# Patient Record
Sex: MALE | Race: WHITE | Employment: OTHER | URBAN - METROPOLITAN AREA
[De-identification: names, ages, dates, MRNs, and addresses within clinical notes are randomized per-mention and may not be internally consistent; named-entity substitution may affect disease eponyms.]

---

## 2020-03-26 ENCOUNTER — HOSPITAL ENCOUNTER (INPATIENT)
Age: 60
LOS: 2 days | Discharge: HOME | DRG: 432 | End: 2020-03-28
Attending: EMERGENCY MEDICINE | Admitting: INTERNAL MEDICINE

## 2020-03-26 ENCOUNTER — APPOINTMENT (OUTPATIENT)
Dept: ULTRASOUND IMAGING | Age: 60
DRG: 432 | End: 2020-03-26

## 2020-03-26 ENCOUNTER — APPOINTMENT (OUTPATIENT)
Dept: MRI IMAGING | Age: 60
DRG: 432 | End: 2020-03-26

## 2020-03-26 ENCOUNTER — APPOINTMENT (OUTPATIENT)
Dept: GENERAL RADIOLOGY | Age: 60
DRG: 432 | End: 2020-03-26

## 2020-03-26 ENCOUNTER — APPOINTMENT (OUTPATIENT)
Dept: CT IMAGING | Age: 60
DRG: 432 | End: 2020-03-26

## 2020-03-26 PROBLEM — D53.9 MACROCYTIC ANEMIA: Status: ACTIVE | Noted: 2020-03-26

## 2020-03-26 PROBLEM — E87.1 HYPONATREMIA: Status: ACTIVE | Noted: 2020-03-26

## 2020-03-26 PROBLEM — E87.6 HYPOKALEMIA: Status: ACTIVE | Noted: 2020-03-26

## 2020-03-26 PROBLEM — K70.30 ALCOHOLIC CIRRHOSIS OF LIVER WITHOUT ASCITES (HCC): Status: ACTIVE | Noted: 2020-03-26

## 2020-03-26 PROBLEM — E86.1 HYPOTENSION DUE TO HYPOVOLEMIA: Status: ACTIVE | Noted: 2020-03-26

## 2020-03-26 PROBLEM — R63.4 UNINTENTIONAL WEIGHT LOSS: Status: ACTIVE | Noted: 2020-03-26

## 2020-03-26 PROBLEM — I95.89 HYPOTENSION DUE TO HYPOVOLEMIA: Status: ACTIVE | Noted: 2020-03-26

## 2020-03-26 PROBLEM — F10.10 ALCOHOL ABUSE: Status: ACTIVE | Noted: 2020-03-26

## 2020-03-26 PROBLEM — K70.10 ALCOHOLIC HEPATITIS WITHOUT ASCITES: Status: ACTIVE | Noted: 2020-03-26

## 2020-03-26 PROBLEM — E80.6 HYPERBILIRUBINEMIA: Status: ACTIVE | Noted: 2020-03-26

## 2020-03-26 LAB
ABSOLUTE EOS #: 0.05 K/UL (ref 0–0.4)
ABSOLUTE IMMATURE GRANULOCYTE: ABNORMAL K/UL (ref 0–0.3)
ABSOLUTE LYMPH #: 0.92 K/UL (ref 1–4.8)
ABSOLUTE MONO #: 0.49 K/UL (ref 0.1–1.3)
ACETAMINOPHEN LEVEL: <5 UG/ML (ref 10–30)
ALBUMIN SERPL-MCNC: 2.6 G/DL (ref 3.5–5.2)
ALBUMIN/GLOBULIN RATIO: ABNORMAL (ref 1–2.5)
ALP BLD-CCNC: 178 U/L (ref 40–129)
ALT SERPL-CCNC: 125 U/L (ref 5–41)
ANION GAP SERPL CALCULATED.3IONS-SCNC: 19 MMOL/L (ref 9–17)
AST SERPL-CCNC: 383 U/L
BASOPHILS # BLD: 1 % (ref 0–2)
BASOPHILS ABSOLUTE: 0.05 K/UL (ref 0–0.2)
BILIRUB SERPL-MCNC: 26.48 MG/DL (ref 0.3–1.2)
BILIRUBIN DIRECT: 21.95 MG/DL
BUN BLDV-MCNC: 17 MG/DL (ref 6–20)
BUN/CREAT BLD: ABNORMAL (ref 9–20)
C-REACTIVE PROTEIN: 18.9 MG/L (ref 0–5)
CALCIUM SERPL-MCNC: 10.7 MG/DL (ref 8.6–10.4)
CHLORIDE BLD-SCNC: 91 MMOL/L (ref 98–107)
CO2: 23 MMOL/L (ref 20–31)
CREAT SERPL-MCNC: <0.4 MG/DL (ref 0.7–1.2)
DIFFERENTIAL TYPE: ABNORMAL
EOSINOPHILS RELATIVE PERCENT: 1 % (ref 0–4)
ETHANOL PERCENT: <0.01 %
ETHANOL: <10 MG/DL
GFR AFRICAN AMERICAN: ABNORMAL ML/MIN
GFR NON-AFRICAN AMERICAN: ABNORMAL ML/MIN
GFR SERPL CREATININE-BSD FRML MDRD: ABNORMAL ML/MIN/{1.73_M2}
GFR SERPL CREATININE-BSD FRML MDRD: ABNORMAL ML/MIN/{1.73_M2}
GLUCOSE BLD-MCNC: 107 MG/DL (ref 70–99)
HAV IGM SER IA-ACNC: NONREACTIVE
HCT VFR BLD CALC: 34.3 % (ref 41–53)
HEMOGLOBIN: 11.6 G/DL (ref 13.5–17.5)
HEPATITIS B CORE IGM ANTIBODY: NONREACTIVE
HEPATITIS B SURFACE ANTIGEN: NONREACTIVE
HEPATITIS C ANTIBODY: NONREACTIVE
HIV AG/AB: NONREACTIVE
IMMATURE GRANULOCYTES: ABNORMAL %
INR BLD: 1.1
IRON SATURATION: ABNORMAL % (ref 20–55)
IRON: 165 UG/DL (ref 59–158)
LYMPHOCYTES # BLD: 17 % (ref 24–44)
MAGNESIUM: 1.7 MG/DL (ref 1.6–2.6)
MCH RBC QN AUTO: 38.2 PG (ref 26–34)
MCHC RBC AUTO-ENTMCNC: 34 G/DL (ref 31–37)
MCV RBC AUTO: 112.5 FL (ref 80–100)
MONOCYTES # BLD: 9 % (ref 1–7)
MORPHOLOGY: ABNORMAL
NRBC AUTOMATED: ABNORMAL PER 100 WBC
PARTIAL THROMBOPLASTIN TIME: 31.6 SEC (ref 24–36)
PATHOLOGIST REVIEW: NORMAL
PDW BLD-RTO: 21.4 % (ref 11.5–14.9)
PHOSPHORUS: 2.3 MG/DL (ref 2.5–4.5)
PLATELET # BLD: 266 K/UL (ref 150–450)
PLATELET ESTIMATE: ABNORMAL
PMV BLD AUTO: 8.2 FL (ref 6–12)
POTASSIUM SERPL-SCNC: 2.3 MMOL/L (ref 3.7–5.3)
PROTHROMBIN TIME: 13.9 SEC (ref 11.8–14.6)
RBC # BLD: 3.05 M/UL (ref 4.5–5.9)
RBC # BLD: ABNORMAL 10*6/UL
SEDIMENTATION RATE, ERYTHROCYTE: 46 MM (ref 0–15)
SEG NEUTROPHILS: 72 % (ref 36–66)
SEGMENTED NEUTROPHILS ABSOLUTE COUNT: 3.89 K/UL (ref 1.3–9.1)
SODIUM BLD-SCNC: 133 MMOL/L (ref 135–144)
TOTAL IRON BINDING CAPACITY: ABNORMAL UG/DL (ref 250–450)
TOTAL PROTEIN: 5.3 G/DL (ref 6.4–8.3)
TRANSFERRIN: 130 MG/DL (ref 200–360)
TSH SERPL DL<=0.05 MIU/L-ACNC: 0.91 MIU/L (ref 0.3–5)
UNSATURATED IRON BINDING CAPACITY: <17 UG/DL (ref 112–347)
WBC # BLD: 5.4 K/UL (ref 3.5–11)
WBC # BLD: ABNORMAL 10*3/UL

## 2020-03-26 PROCEDURE — 83516 IMMUNOASSAY NONANTIBODY: CPT

## 2020-03-26 PROCEDURE — 36415 COLL VENOUS BLD VENIPUNCTURE: CPT

## 2020-03-26 PROCEDURE — 71046 X-RAY EXAM CHEST 2 VIEWS: CPT

## 2020-03-26 PROCEDURE — 2580000003 HC RX 258: Performed by: INTERNAL MEDICINE

## 2020-03-26 PROCEDURE — 87389 HIV-1 AG W/HIV-1&-2 AB AG IA: CPT

## 2020-03-26 PROCEDURE — 80307 DRUG TEST PRSMV CHEM ANLYZR: CPT

## 2020-03-26 PROCEDURE — 85730 THROMBOPLASTIN TIME PARTIAL: CPT

## 2020-03-26 PROCEDURE — 76377 3D RENDER W/INTRP POSTPROCES: CPT

## 2020-03-26 PROCEDURE — 84100 ASSAY OF PHOSPHORUS: CPT

## 2020-03-26 PROCEDURE — 76705 ECHO EXAM OF ABDOMEN: CPT

## 2020-03-26 PROCEDURE — 6370000000 HC RX 637 (ALT 250 FOR IP): Performed by: STUDENT IN AN ORGANIZED HEALTH CARE EDUCATION/TRAINING PROGRAM

## 2020-03-26 PROCEDURE — 99223 1ST HOSP IP/OBS HIGH 75: CPT | Performed by: INTERNAL MEDICINE

## 2020-03-26 PROCEDURE — 85651 RBC SED RATE NONAUTOMATED: CPT

## 2020-03-26 PROCEDURE — 84466 ASSAY OF TRANSFERRIN: CPT

## 2020-03-26 PROCEDURE — 2580000003 HC RX 258: Performed by: STUDENT IN AN ORGANIZED HEALTH CARE EDUCATION/TRAINING PROGRAM

## 2020-03-26 PROCEDURE — 93005 ELECTROCARDIOGRAM TRACING: CPT | Performed by: EMERGENCY MEDICINE

## 2020-03-26 PROCEDURE — 6360000002 HC RX W HCPCS: Performed by: INTERNAL MEDICINE

## 2020-03-26 PROCEDURE — 6370000000 HC RX 637 (ALT 250 FOR IP): Performed by: EMERGENCY MEDICINE

## 2020-03-26 PROCEDURE — 84443 ASSAY THYROID STIM HORMONE: CPT

## 2020-03-26 PROCEDURE — 80074 ACUTE HEPATITIS PANEL: CPT

## 2020-03-26 PROCEDURE — 83735 ASSAY OF MAGNESIUM: CPT

## 2020-03-26 PROCEDURE — 83550 IRON BINDING TEST: CPT

## 2020-03-26 PROCEDURE — 85025 COMPLETE CBC W/AUTO DIFF WBC: CPT

## 2020-03-26 PROCEDURE — 82248 BILIRUBIN DIRECT: CPT

## 2020-03-26 PROCEDURE — 74177 CT ABD & PELVIS W/CONTRAST: CPT

## 2020-03-26 PROCEDURE — 80053 COMPREHEN METABOLIC PANEL: CPT

## 2020-03-26 PROCEDURE — 85610 PROTHROMBIN TIME: CPT

## 2020-03-26 PROCEDURE — 86140 C-REACTIVE PROTEIN: CPT

## 2020-03-26 PROCEDURE — 6360000004 HC RX CONTRAST MEDICATION: Performed by: EMERGENCY MEDICINE

## 2020-03-26 PROCEDURE — 83540 ASSAY OF IRON: CPT

## 2020-03-26 PROCEDURE — 99285 EMERGENCY DEPT VISIT HI MDM: CPT

## 2020-03-26 PROCEDURE — 2580000003 HC RX 258: Performed by: EMERGENCY MEDICINE

## 2020-03-26 PROCEDURE — 86038 ANTINUCLEAR ANTIBODIES: CPT

## 2020-03-26 PROCEDURE — G0480 DRUG TEST DEF 1-7 CLASSES: HCPCS

## 2020-03-26 PROCEDURE — 2060000000 HC ICU INTERMEDIATE R&B

## 2020-03-26 RX ORDER — SODIUM CHLORIDE 0.9 % (FLUSH) 0.9 %
10 SYRINGE (ML) INJECTION PRN
Status: DISCONTINUED | OUTPATIENT
Start: 2020-03-26 | End: 2020-03-28 | Stop reason: HOSPADM

## 2020-03-26 RX ORDER — DIPHENHYDRAMINE HCL, IBUPROFEN 25; 200 MG/1; MG/1
1 CAPSULE, LIQUID FILLED ORAL NIGHTLY PRN
COMMUNITY
End: 2020-05-12

## 2020-03-26 RX ORDER — ONDANSETRON 4 MG/1
4 TABLET, ORALLY DISINTEGRATING ORAL ONCE
Status: COMPLETED | OUTPATIENT
Start: 2020-03-26 | End: 2020-03-26

## 2020-03-26 RX ORDER — 0.9 % SODIUM CHLORIDE 0.9 %
80 INTRAVENOUS SOLUTION INTRAVENOUS ONCE
Status: COMPLETED | OUTPATIENT
Start: 2020-03-26 | End: 2020-03-26

## 2020-03-26 RX ORDER — FAMOTIDINE 20 MG/1
20 TABLET, FILM COATED ORAL 2 TIMES DAILY
Status: DISCONTINUED | OUTPATIENT
Start: 2020-03-26 | End: 2020-03-28 | Stop reason: HOSPADM

## 2020-03-26 RX ORDER — POLYETHYLENE GLYCOL 3350 17 G/17G
17 POWDER, FOR SOLUTION ORAL DAILY PRN
Status: DISCONTINUED | OUTPATIENT
Start: 2020-03-26 | End: 2020-03-28 | Stop reason: HOSPADM

## 2020-03-26 RX ORDER — PROMETHAZINE HYDROCHLORIDE 25 MG/1
12.5 TABLET ORAL EVERY 6 HOURS PRN
Status: DISCONTINUED | OUTPATIENT
Start: 2020-03-26 | End: 2020-03-28 | Stop reason: HOSPADM

## 2020-03-26 RX ORDER — LORAZEPAM 2 MG/ML
1 INJECTION INTRAMUSCULAR
Status: DISCONTINUED | OUTPATIENT
Start: 2020-03-26 | End: 2020-03-27

## 2020-03-26 RX ORDER — THIAMINE MONONITRATE (VIT B1) 100 MG
100 TABLET ORAL DAILY
Status: DISCONTINUED | OUTPATIENT
Start: 2020-03-26 | End: 2020-03-28 | Stop reason: HOSPADM

## 2020-03-26 RX ORDER — POTASSIUM CHLORIDE 20 MEQ/1
40 TABLET, EXTENDED RELEASE ORAL PRN
Status: DISCONTINUED | OUTPATIENT
Start: 2020-03-26 | End: 2020-03-28 | Stop reason: HOSPADM

## 2020-03-26 RX ORDER — SODIUM CHLORIDE 9 MG/ML
INJECTION, SOLUTION INTRAVENOUS CONTINUOUS
Status: DISCONTINUED | OUTPATIENT
Start: 2020-03-26 | End: 2020-03-27

## 2020-03-26 RX ORDER — POTASSIUM CHLORIDE 40 MEQ/30ML
60 LIQUID ORAL ONCE
Status: DISCONTINUED | OUTPATIENT
Start: 2020-03-26 | End: 2020-03-26

## 2020-03-26 RX ORDER — 0.9 % SODIUM CHLORIDE 0.9 %
1000 INTRAVENOUS SOLUTION INTRAVENOUS ONCE
Status: COMPLETED | OUTPATIENT
Start: 2020-03-26 | End: 2020-03-26

## 2020-03-26 RX ORDER — LORAZEPAM 2 MG/ML
4 INJECTION INTRAMUSCULAR
Status: DISCONTINUED | OUTPATIENT
Start: 2020-03-26 | End: 2020-03-27

## 2020-03-26 RX ORDER — POTASSIUM CHLORIDE 7.45 MG/ML
10 INJECTION INTRAVENOUS PRN
Status: DISCONTINUED | OUTPATIENT
Start: 2020-03-26 | End: 2020-03-28 | Stop reason: HOSPADM

## 2020-03-26 RX ORDER — ONDANSETRON 2 MG/ML
4 INJECTION INTRAMUSCULAR; INTRAVENOUS EVERY 6 HOURS PRN
Status: DISCONTINUED | OUTPATIENT
Start: 2020-03-26 | End: 2020-03-28 | Stop reason: HOSPADM

## 2020-03-26 RX ORDER — LORAZEPAM 1 MG/1
3 TABLET ORAL
Status: DISCONTINUED | OUTPATIENT
Start: 2020-03-26 | End: 2020-03-27

## 2020-03-26 RX ORDER — LORAZEPAM 1 MG/1
1 TABLET ORAL
Status: DISCONTINUED | OUTPATIENT
Start: 2020-03-26 | End: 2020-03-27

## 2020-03-26 RX ORDER — SODIUM CHLORIDE 9 MG/ML
INJECTION, SOLUTION INTRAVENOUS CONTINUOUS
Status: DISCONTINUED | OUTPATIENT
Start: 2020-03-26 | End: 2020-03-27 | Stop reason: SDUPTHER

## 2020-03-26 RX ORDER — SODIUM CHLORIDE 0.9 % (FLUSH) 0.9 %
10 SYRINGE (ML) INJECTION EVERY 12 HOURS SCHEDULED
Status: DISCONTINUED | OUTPATIENT
Start: 2020-03-26 | End: 2020-03-27 | Stop reason: SDUPTHER

## 2020-03-26 RX ORDER — M-VIT,TX,IRON,MINS/CALC/FOLIC 27MG-0.4MG
1 TABLET ORAL DAILY
Status: DISCONTINUED | OUTPATIENT
Start: 2020-03-26 | End: 2020-03-28 | Stop reason: HOSPADM

## 2020-03-26 RX ORDER — SODIUM CHLORIDE 0.9 % (FLUSH) 0.9 %
10 SYRINGE (ML) INJECTION EVERY 12 HOURS SCHEDULED
Status: DISCONTINUED | OUTPATIENT
Start: 2020-03-26 | End: 2020-03-28 | Stop reason: HOSPADM

## 2020-03-26 RX ORDER — FOLIC ACID 1 MG/1
1 TABLET ORAL DAILY
Status: DISCONTINUED | OUTPATIENT
Start: 2020-03-26 | End: 2020-03-28 | Stop reason: HOSPADM

## 2020-03-26 RX ORDER — LORAZEPAM 1 MG/1
4 TABLET ORAL
Status: DISCONTINUED | OUTPATIENT
Start: 2020-03-26 | End: 2020-03-27

## 2020-03-26 RX ORDER — LORAZEPAM 2 MG/ML
2 INJECTION INTRAMUSCULAR
Status: DISCONTINUED | OUTPATIENT
Start: 2020-03-26 | End: 2020-03-27

## 2020-03-26 RX ORDER — SODIUM CHLORIDE 0.9 % (FLUSH) 0.9 %
10 SYRINGE (ML) INJECTION PRN
Status: DISCONTINUED | OUTPATIENT
Start: 2020-03-26 | End: 2020-03-27 | Stop reason: SDUPTHER

## 2020-03-26 RX ORDER — LORAZEPAM 1 MG/1
2 TABLET ORAL
Status: DISCONTINUED | OUTPATIENT
Start: 2020-03-26 | End: 2020-03-27

## 2020-03-26 RX ORDER — LORAZEPAM 2 MG/ML
3 INJECTION INTRAMUSCULAR
Status: DISCONTINUED | OUTPATIENT
Start: 2020-03-26 | End: 2020-03-27

## 2020-03-26 RX ORDER — 0.9 % SODIUM CHLORIDE 0.9 %
1000 INTRAVENOUS SOLUTION INTRAVENOUS ONCE
Status: DISCONTINUED | OUTPATIENT
Start: 2020-03-26 | End: 2020-03-26

## 2020-03-26 RX ADMIN — SODIUM CHLORIDE 1000 ML: 9 INJECTION, SOLUTION INTRAVENOUS at 15:16

## 2020-03-26 RX ADMIN — THIAMINE HCL TAB 100 MG 100 MG: 100 TAB at 23:02

## 2020-03-26 RX ADMIN — MULTIPLE VITAMINS W/ MINERALS TAB 1 TABLET: TAB at 23:02

## 2020-03-26 RX ADMIN — SODIUM CHLORIDE 1000 ML: 9 INJECTION, SOLUTION INTRAVENOUS at 14:35

## 2020-03-26 RX ADMIN — ONDANSETRON 4 MG: 4 TABLET, ORALLY DISINTEGRATING ORAL at 15:35

## 2020-03-26 RX ADMIN — SODIUM CHLORIDE 80 ML: 9 INJECTION, SOLUTION INTRAVENOUS at 15:55

## 2020-03-26 RX ADMIN — POTASSIUM BICARBONATE 60 MEQ: 782 TABLET, EFFERVESCENT ORAL at 15:35

## 2020-03-26 RX ADMIN — SODIUM CHLORIDE: 9 INJECTION, SOLUTION INTRAVENOUS at 20:54

## 2020-03-26 RX ADMIN — FOLIC ACID 1 MG: 1 TABLET ORAL at 23:02

## 2020-03-26 RX ADMIN — IOPAMIDOL 75 ML: 755 INJECTION, SOLUTION INTRAVENOUS at 15:55

## 2020-03-26 RX ADMIN — FAMOTIDINE 20 MG: 20 TABLET ORAL at 23:02

## 2020-03-26 RX ADMIN — SODIUM CHLORIDE: 9 INJECTION, SOLUTION INTRAVENOUS at 17:26

## 2020-03-26 RX ADMIN — Medication 10 ML: at 23:00

## 2020-03-26 RX ADMIN — Medication 10 ML: at 15:55

## 2020-03-26 ASSESSMENT — ENCOUNTER SYMPTOMS
WHEEZING: 0
COLOR CHANGE: 1
SHORTNESS OF BREATH: 0
CONSTIPATION: 0
SORE THROAT: 0
BACK PAIN: 0
ABDOMINAL PAIN: 0
NAUSEA: 0
BLOOD IN STOOL: 0
COUGH: 0
SINUS PRESSURE: 0
VOMITING: 0
DIARRHEA: 0
SINUS PAIN: 0
RHINORRHEA: 0

## 2020-03-26 NOTE — ED NOTES
Bed: 08  Expected date:   Expected time:   Means of arrival:   Comments:  Chela Espinal RN  03/26/20 7770

## 2020-03-26 NOTE — H&P
use.  Drug Use:  reports no history of drug use. Family History:     History reviewed. No pertinent family history. Father: Non Hodkins lymphoma      Review of Systems:     Positive and Negative as described in HPI. Review of Systems   Constitutional: Positive for appetite change, fatigue and unexpected weight change. Negative for chills, diaphoresis and fever. HENT: Negative for congestion, rhinorrhea, sinus pressure, sinus pain and sore throat. Eyes: Negative for visual disturbance. Respiratory: Negative for cough, shortness of breath and wheezing. Cardiovascular: Negative for chest pain, palpitations and leg swelling. Gastrointestinal: Negative for blood in stool, constipation, diarrhea, nausea and vomiting. Genitourinary: Negative for difficulty urinating and dysuria. Musculoskeletal: Negative for arthralgias, back pain, joint swelling and neck stiffness. Neurological: Positive for weakness. Negative for dizziness, light-headedness and headaches. Psychiatric/Behavioral: Negative for dysphoric mood and suicidal ideas. Physical Exam:   BP 99/73   Pulse 80   Temp 98.5 °F (36.9 °C) (Oral)   Resp 17   Wt 185 lb (83.9 kg)   SpO2 98%   Temp (24hrs), Av.5 °F (36.9 °C), Min:98.5 °F (36.9 °C), Max:98.5 °F (36.9 °C)    No results for input(s): POCGLU in the last 72 hours. No intake or output data in the 24 hours ending 20 1711    Physical Exam  Vitals signs and nursing note reviewed. Constitutional:       General: He is not in acute distress. Appearance: He is normal weight. Comments: Patient is very jaundiced   HENT:      Head: Normocephalic and atraumatic. Mouth/Throat:      Mouth: Mucous membranes are dry. Pharynx: No oropharyngeal exudate or posterior oropharyngeal erythema. Eyes:      General: Scleral icterus present. Extraocular Movements: Extraocular movements intact. Pupils: Pupils are equal, round, and reactive to light. sedimentation rate, CRP, anti-smooth muscle, antimitochondrial, TSH, iron, transferrin  - GI is following    *Alcohol abuse  -Patient states he has been sober for 3 weeks  - [facility] protocol  -Chest x-ray     *Malnutrition, poor oral intake  - Dietary consult  - Low threshold for refeeding syndrome  -Monitor phosphorus and magnesium    *Hypokalemia without EKG changes  - EKG shows normal sinus rhythm  - Potassium 2.3  -60 mEq given in the ED  -Potassium sliding scale    *Mild hyponatremia  -Sodium 133  -Normal saline 3 L bolus  -We will recheck    Code: Full  DVT: Lovenox  Diet: General  GI: Pepcid 20 mg twice daily  Disposition: Admit      Consultations:   IP CONSULT TO GI  IP CONSULT TO INTERNAL MEDICINE    Patient is admitted as inpatient status because of co-morbiditieslisted above, severity of signs and symptoms as outlined, requirement for current medical therapies and most importantly because of direct risk to patient if care not provided in a hospital setting. Angie Funk MD  3/26/2020  5:11 PM    Copy sent to Dr. Samuel Kurtz primary care provider on file. Attending Physician Statement  I have discussed the care of Hao Camacho with the resident team. I have examined the patient myself and taken ros and hpi , including pertinent history and exam findings,  with the resident. I have reviewed the key elements of all parts of the encounter with the resident. I agree with the assessment, plan and orders as documented by the resident. 62yr old male admitted with 2 week h/o fatigue, wt loss and progressive jaundice.    Patient Active Problem List   Diagnosis    Hyperbilirubinemia    Alcoholic hepatitis without ascites    Unintentional weight loss    Hypotension due to hypovolemia    Macrocytic anemia    Hyponatremia    Hypokalemia    Alcohol abuse     Liver enzymes elevated, PT normal, Maddrey score 23- no need for steroids  Last drink 3 wk ago- low risk of withdrawal  Jauindice- CT abdo- no

## 2020-03-26 NOTE — ED PROVIDER NOTES
Acetaminophen Level <5 (*)     All other components within normal limits   BILIRUBIN, DIRECT - Abnormal; Notable for the following components:    Bilirubin, Direct 21.95 (*)     All other components within normal limits   APTT   PROTIME-INR   ETHANOL   PERIPHERAL BLOOD SMEAR, PATH REVIEW   HEPATITIS PANEL, ACUTE       EMERGENCY DEPARTMENTCOURSE:         Vitals:    Vitals:    03/26/20 1600 03/26/20 1615 03/26/20 1630 03/26/20 1645   BP: 102/82 99/73 94/69 93/71   Pulse: 75 80 87 84   Resp: 14 17 18 14   Temp:       TempSrc:       SpO2: 97% 98% 94% 97%   Weight:           The patient was given the following medications while in the emergency department:  Orders Placed This Encounter   Medications    0.9 % sodium chloride bolus    DISCONTD: 0.9 % sodium chloride bolus    0.9 % sodium chloride bolus    DISCONTD: potassium chloride 20 MEQ/15ML (10%) (PED-ISAAC) solution 60 mEq    potassium bicarb-citric acid (EFFER-K) effervescent tablet 60 mEq    ondansetron (ZOFRAN-ODT) disintegrating tablet 4 mg    iopamidol (ISOVUE-370) 76 % injection 75 mL    0.9 % sodium chloride bolus    sodium chloride flush 0.9 % injection 10 mL    0.9 % sodium chloride infusion     CONSULTS:  IP CONSULT TO GI  IP CONSULT TO INTERNAL MEDICINE    FINAL IMPRESSION      1. Hyperbilirubinemia    2. Alcoholic hepatitis without ascites    3. Hypokalemia    4. Dehydration          DISPOSITION/PLAN   DISPOSITION Admitted 03/26/2020 05:08:50 PM      PATIENT REFERRED TO:  No follow-up provider specified.   DISCHARGE MEDICATIONS:  New Prescriptions    No medications on file     Jatin Feng MD  Attending Emergency Physician                    Jhonny Dexter MD  03/26/20 0260

## 2020-03-27 PROBLEM — E43 SEVERE MALNUTRITION (HCC): Status: ACTIVE | Noted: 2020-03-27

## 2020-03-27 PROBLEM — D64.9 ANEMIA: Status: ACTIVE | Noted: 2020-03-26

## 2020-03-27 LAB
ABSOLUTE EOS #: 0.1 K/UL (ref 0–0.4)
ABSOLUTE IMMATURE GRANULOCYTE: ABNORMAL K/UL (ref 0–0.3)
ABSOLUTE LYMPH #: 1.63 K/UL (ref 1–4.8)
ABSOLUTE MONO #: 0.38 K/UL (ref 0.1–1.3)
AFP: 1.6 UG/L
ALBUMIN SERPL-MCNC: 2.3 G/DL (ref 3.5–5.2)
ALBUMIN/GLOBULIN RATIO: ABNORMAL (ref 1–2.5)
ALP BLD-CCNC: 154 U/L (ref 40–129)
ALPHA-1 ANTITRYPSIN: 146 MG/DL (ref 90–200)
ALT SERPL-CCNC: 103 U/L (ref 5–41)
AMMONIA: 68 UMOL/L (ref 16–60)
ANION GAP SERPL CALCULATED.3IONS-SCNC: 10 MMOL/L (ref 9–17)
ANION GAP SERPL CALCULATED.3IONS-SCNC: 10 MMOL/L (ref 9–17)
ANTI-NUCLEAR ANTIBODY (ANA): NEGATIVE
AST SERPL-CCNC: 301 U/L
BASOPHILS # BLD: 2 % (ref 0–2)
BASOPHILS ABSOLUTE: 0.1 K/UL (ref 0–0.2)
BILIRUB SERPL-MCNC: 22.28 MG/DL (ref 0.3–1.2)
BUN BLDV-MCNC: 14 MG/DL (ref 6–20)
BUN BLDV-MCNC: 16 MG/DL (ref 6–20)
BUN/CREAT BLD: ABNORMAL (ref 9–20)
BUN/CREAT BLD: ABNORMAL (ref 9–20)
CALCIUM SERPL-MCNC: 9.2 MG/DL (ref 8.6–10.4)
CALCIUM SERPL-MCNC: 9.4 MG/DL (ref 8.6–10.4)
CERULOPLASMIN: 25 MG/DL (ref 15–30)
CHLORIDE BLD-SCNC: 100 MMOL/L (ref 98–107)
CHLORIDE BLD-SCNC: 97 MMOL/L (ref 98–107)
CO2: 24 MMOL/L (ref 20–31)
CO2: 25 MMOL/L (ref 20–31)
CREAT SERPL-MCNC: <0.4 MG/DL (ref 0.7–1.2)
CREAT SERPL-MCNC: <0.4 MG/DL (ref 0.7–1.2)
DIFFERENTIAL TYPE: ABNORMAL
EKG ATRIAL RATE: 75 BPM
EKG P AXIS: 27 DEGREES
EKG P-R INTERVAL: 172 MS
EKG Q-T INTERVAL: 426 MS
EKG QRS DURATION: 106 MS
EKG QTC CALCULATION (BAZETT): 475 MS
EKG R AXIS: -42 DEGREES
EKG T AXIS: 0 DEGREES
EKG VENTRICULAR RATE: 75 BPM
EOSINOPHILS RELATIVE PERCENT: 2 % (ref 0–4)
GFR AFRICAN AMERICAN: ABNORMAL ML/MIN
GFR AFRICAN AMERICAN: ABNORMAL ML/MIN
GFR NON-AFRICAN AMERICAN: ABNORMAL ML/MIN
GFR NON-AFRICAN AMERICAN: ABNORMAL ML/MIN
GFR SERPL CREATININE-BSD FRML MDRD: ABNORMAL ML/MIN/{1.73_M2}
GGT: 203 U/L (ref 8–61)
GLUCOSE BLD-MCNC: 102 MG/DL (ref 70–99)
GLUCOSE BLD-MCNC: 98 MG/DL (ref 70–99)
HCT VFR BLD CALC: 27.1 % (ref 41–53)
HEMOGLOBIN: 9.5 G/DL (ref 13.5–17.5)
IGA: 603 MG/DL (ref 70–400)
IMMATURE GRANULOCYTES: ABNORMAL %
LYMPHOCYTES # BLD: 34 % (ref 24–44)
MAGNESIUM: 1.7 MG/DL (ref 1.6–2.6)
MAGNESIUM: 1.7 MG/DL (ref 1.6–2.6)
MCH RBC QN AUTO: 39.7 PG (ref 26–34)
MCHC RBC AUTO-ENTMCNC: 34.9 G/DL (ref 31–37)
MCV RBC AUTO: 113.6 FL (ref 80–100)
MONOCYTES # BLD: 8 % (ref 1–7)
MORPHOLOGY: ABNORMAL
NRBC AUTOMATED: ABNORMAL PER 100 WBC
PATHOLOGIST REVIEW: NORMAL
PDW BLD-RTO: 20.8 % (ref 11.5–14.9)
PLATELET # BLD: 190 K/UL (ref 150–450)
PLATELET ESTIMATE: ABNORMAL
PMV BLD AUTO: 7.8 FL (ref 6–12)
POTASSIUM SERPL-SCNC: 2.4 MMOL/L (ref 3.7–5.3)
POTASSIUM SERPL-SCNC: 2.7 MMOL/L (ref 3.7–5.3)
POTASSIUM SERPL-SCNC: 3.1 MMOL/L (ref 3.7–5.3)
POTASSIUM SERPL-SCNC: 3.1 MMOL/L (ref 3.7–5.3)
POTASSIUM SERPL-SCNC: 3.6 MMOL/L (ref 3.7–5.3)
RBC # BLD: 2.38 M/UL (ref 4.5–5.9)
RBC # BLD: ABNORMAL 10*6/UL
SALICYLATE LEVEL: <1 MG/DL (ref 3–10)
SEG NEUTROPHILS: 54 % (ref 36–66)
SEGMENTED NEUTROPHILS ABSOLUTE COUNT: 2.59 K/UL (ref 1.3–9.1)
SODIUM BLD-SCNC: 132 MMOL/L (ref 135–144)
SODIUM BLD-SCNC: 134 MMOL/L (ref 135–144)
TOTAL PROTEIN: 4.5 G/DL (ref 6.4–8.3)
WBC # BLD: 4.8 K/UL (ref 3.5–11)
WBC # BLD: ABNORMAL 10*3/UL

## 2020-03-27 PROCEDURE — 86663 EPSTEIN-BARR ANTIBODY: CPT

## 2020-03-27 PROCEDURE — 86645 CMV ANTIBODY IGM: CPT

## 2020-03-27 PROCEDURE — 97165 OT EVAL LOW COMPLEX 30 MIN: CPT

## 2020-03-27 PROCEDURE — 6370000000 HC RX 637 (ALT 250 FOR IP): Performed by: STUDENT IN AN ORGANIZED HEALTH CARE EDUCATION/TRAINING PROGRAM

## 2020-03-27 PROCEDURE — 6360000002 HC RX W HCPCS: Performed by: INTERNAL MEDICINE

## 2020-03-27 PROCEDURE — 86644 CMV ANTIBODY: CPT

## 2020-03-27 PROCEDURE — 2580000003 HC RX 258: Performed by: INTERNAL MEDICINE

## 2020-03-27 PROCEDURE — 82140 ASSAY OF AMMONIA: CPT

## 2020-03-27 PROCEDURE — 83735 ASSAY OF MAGNESIUM: CPT

## 2020-03-27 PROCEDURE — 99254 IP/OBS CNSLTJ NEW/EST MOD 60: CPT | Performed by: INTERNAL MEDICINE

## 2020-03-27 PROCEDURE — 82103 ALPHA-1-ANTITRYPSIN TOTAL: CPT

## 2020-03-27 PROCEDURE — 84132 ASSAY OF SERUM POTASSIUM: CPT

## 2020-03-27 PROCEDURE — 82390 ASSAY OF CERULOPLASMIN: CPT

## 2020-03-27 PROCEDURE — 82784 ASSAY IGA/IGD/IGG/IGM EACH: CPT

## 2020-03-27 PROCEDURE — 80053 COMPREHEN METABOLIC PANEL: CPT

## 2020-03-27 PROCEDURE — 85025 COMPLETE CBC W/AUTO DIFF WBC: CPT

## 2020-03-27 PROCEDURE — 99239 HOSP IP/OBS DSCHRG MGMT >30: CPT | Performed by: INTERNAL MEDICINE

## 2020-03-27 PROCEDURE — 82105 ALPHA-FETOPROTEIN SERUM: CPT

## 2020-03-27 PROCEDURE — 2580000003 HC RX 258: Performed by: STUDENT IN AN ORGANIZED HEALTH CARE EDUCATION/TRAINING PROGRAM

## 2020-03-27 PROCEDURE — 86665 EPSTEIN-BARR CAPSID VCA: CPT

## 2020-03-27 PROCEDURE — 80048 BASIC METABOLIC PNL TOTAL CA: CPT

## 2020-03-27 PROCEDURE — 86664 EPSTEIN-BARR NUCLEAR ANTIGEN: CPT

## 2020-03-27 PROCEDURE — APPNB30 APP NON BILLABLE TIME 0-30 MINS: Performed by: NURSE PRACTITIONER

## 2020-03-27 PROCEDURE — 2060000000 HC ICU INTERMEDIATE R&B

## 2020-03-27 PROCEDURE — 86376 MICROSOMAL ANTIBODY EACH: CPT

## 2020-03-27 PROCEDURE — 36415 COLL VENOUS BLD VENIPUNCTURE: CPT

## 2020-03-27 PROCEDURE — 93010 ELECTROCARDIOGRAM REPORT: CPT | Performed by: INTERNAL MEDICINE

## 2020-03-27 PROCEDURE — 82977 ASSAY OF GGT: CPT

## 2020-03-27 PROCEDURE — 80307 DRUG TEST PRSMV CHEM ANLYZR: CPT

## 2020-03-27 RX ORDER — MIDODRINE HYDROCHLORIDE 10 MG/1
10 TABLET ORAL 3 TIMES DAILY
Status: DISCONTINUED | OUTPATIENT
Start: 2020-03-27 | End: 2020-03-28 | Stop reason: HOSPADM

## 2020-03-27 RX ORDER — MIDODRINE HYDROCHLORIDE 5 MG/1
5 TABLET ORAL ONCE
Status: COMPLETED | OUTPATIENT
Start: 2020-03-27 | End: 2020-03-27

## 2020-03-27 RX ORDER — MIDODRINE HYDROCHLORIDE 5 MG/1
5 TABLET ORAL
Status: DISCONTINUED | OUTPATIENT
Start: 2020-03-27 | End: 2020-03-27

## 2020-03-27 RX ORDER — MIDODRINE HYDROCHLORIDE 10 MG/1
10 TABLET ORAL 3 TIMES DAILY
Status: DISCONTINUED | OUTPATIENT
Start: 2020-03-27 | End: 2020-03-27

## 2020-03-27 RX ADMIN — MIDODRINE HYDROCHLORIDE 10 MG: 10 TABLET ORAL at 19:11

## 2020-03-27 RX ADMIN — FAMOTIDINE 20 MG: 20 TABLET ORAL at 23:33

## 2020-03-27 RX ADMIN — POTASSIUM CHLORIDE: 2 INJECTION, SOLUTION, CONCENTRATE INTRAVENOUS at 02:55

## 2020-03-27 RX ADMIN — POTASSIUM BICARBONATE 40 MEQ: 782 TABLET, EFFERVESCENT ORAL at 12:02

## 2020-03-27 RX ADMIN — MIDODRINE HYDROCHLORIDE 5 MG: 5 TABLET ORAL at 14:34

## 2020-03-27 RX ADMIN — FAMOTIDINE 20 MG: 20 TABLET ORAL at 09:50

## 2020-03-27 RX ADMIN — THIAMINE HCL TAB 100 MG 100 MG: 100 TAB at 09:50

## 2020-03-27 RX ADMIN — ENOXAPARIN SODIUM 40 MG: 40 INJECTION SUBCUTANEOUS at 09:49

## 2020-03-27 RX ADMIN — MIDODRINE HYDROCHLORIDE 5 MG: 5 TABLET ORAL at 16:49

## 2020-03-27 RX ADMIN — FOLIC ACID 1 MG: 1 TABLET ORAL at 09:50

## 2020-03-27 RX ADMIN — POTASSIUM BICARBONATE 40 MEQ: 782 TABLET, EFFERVESCENT ORAL at 16:49

## 2020-03-27 RX ADMIN — MULTIPLE VITAMINS W/ MINERALS TAB 1 TABLET: TAB at 09:50

## 2020-03-27 RX ADMIN — SODIUM CHLORIDE: 9 INJECTION, SOLUTION INTRAVENOUS at 02:55

## 2020-03-27 ASSESSMENT — PAIN SCALES - GENERAL
PAINLEVEL_OUTOF10: 0

## 2020-03-27 NOTE — PROGRESS NOTES
assistance X2 - so he came to the hospital           Goals  Patient Goals   Patient goals : Regain strength to return home   Short term goals  Time Frame for Short term goals: 1-3 days  Short term goal 1: Tolerate 10-25 minutes of general activity / exercise to support ADL needs   Short term goal 2: D/V understanding of fall prevention and home safety strategies with application to care needs   Short term goal 3: Participate in care using safe techniques for mobility and self care tasks     Plan  Safety Devices  Safety Devices in place: Yes  Type of devices: Call light within reach, Left in bed     Plan  Times per week: 1-3 sessions   Times per day: Daily  Current Treatment Recommendations: Strengthening, Endurance Training, Patient/Caregiver Education & Training, Safety Education & Training, Functional Mobility Training, Self-Care / ADL  OT Education  OT Education: OT Role, ADL Adaptive Strategies, Plan of Care, Transfer Training, Energy Conservation, Precautions       OT Individual Minutes  Time In: 2904  Time Out: 4676  Minutes: 25    Electronically signed by Krishna Marie OT on 3/27/20 at 2:45 PM EDT

## 2020-03-27 NOTE — PROGRESS NOTES
Admitted to room 2087 from ED per Dr. Paul Ramey. Oriented to room and call light. Vitals and assessment completed. No distress noted. [facility]  DVT Prophylaxis and Vaccine Status  Work List  Mandatory for all patients      Patient must be on both Chemical prophylaxis and Mechanical prophylaxis.  If chemical/mechanical prophylaxis is not ordered, the physician must document a reason for not using prophylaxis     Chemical Prophylaxis  Is patient on chemical prophylaxis: Yes  If no chemical prophylaxis Is a order in for No Chemical VTE prophylaxisNo  If no was the physician notified not applicable      Mechanical Prophylaxis  Is patient on mechanical prophylaxis, intermittent pneumatic compression device: Yes  If no was the physician notified not applicable        Pneumonia Vaccine  Vaccine indicated:  Not indicated  If indicated was the vaccine given: not applicable    Influenza Vaccine (applicable from October through March):  Vaccine indicated: Vaccination indicated  If indicated was the vaccine given: Patient refused    Patient Education  Education completed on DVT prophylaxis: yes

## 2020-03-27 NOTE — PROGRESS NOTES
Report given to Cecilia Johnson RN about low bp and monitoring for potassium recheck. Midodrine 10 mg administered by Ector Vigil and awaiting recheck of bp as well as result of potassium level that was obtained at 1900.

## 2020-03-27 NOTE — PROGRESS NOTES
Talked with Dr. Carina Simons about low bp. Give midodrine 10 mg po again now. Recheck bp in one hour after administration. If still low, contact CNP overnight for additional orders.

## 2020-03-27 NOTE — PLAN OF CARE
Problem: Infection:  Goal: Will remain free from infection  Description: Will remain free from infection  Outcome: Met This Shift   No active infections    Problem: Safety:  Goal: Free from accidental physical injury  Description: Free from accidental physical injury  Outcome: Met This Shift   No injuries this shift. Patient's safety maintained.     Problem: Daily Care:  Goal: Daily care needs are met  Description: Daily care needs are met  Outcome: Ongoing     Problem: Discharge Planning:  Goal: Patients continuum of care needs are met  Description: Patients continuum of care needs are met  Outcome: Ongoing
thyromegaly or thyroid nodules, no cervical lymphadenopathy   Pulmonary/Chest: clear to auscultation bilaterally- no wheezes, rales or rhonchi, normal air movement, no respiratory distress  Cardiovascular: normal rate, regular rhythm, normal S1 and S2, no murmurs, rubs, clicks or gallops, distal pulses intact, no carotid bruits  Abdomen: soft, obese non tender, non-distended, normal bowel sounds, no masses or organomegaly no ascites  Extremities: no cyanosis, clubbing or edema  Musculoskeletal: normal range of motion, no joint swelling, deformity or tenderness  Neurologic: no cranial nerve deficit and muscle strength normal    Assessment  Elevated lft's with painless jaundice likely etoh hepatitis, hepatic steatosis with  possible cirrhosis  Anemia  Alcohol abuse  Unintentional weight loss    Plan  Will discuss imaging with md  2 gm low salt diet  Trend lft and inr  Salicylate and autoimmune w/o ordered  Will discuss steroids with md since DF is 31  Avoid any sedatives and narcotics  CIWA protocol  Needs etoh rehab      DF score is 31    Formal gi consult to follow  . Denice Márquez, GRACE - CNP

## 2020-03-27 NOTE — DISCHARGE INSTR - COC
Continuity of Care Form    Patient Name: John Paul Oconnor   :  1960  MRN:  [id number]    Admit date:  3/26/2020  Discharge date:  ***    Code Status Order: Full Code   Advance Directives:   Advance Care Flowsheet Documentation     Date/Time Healthcare Directive Type of Healthcare Directive Copy in [address] Agent's Name Healthcare Agent's Phone Number    20  No, patient does not have an advance directive for healthcare treatment  --  --  --  --  --          Admitting Physician:  Hermelindo Youngblood MD  PCP: No primary care provider on file. Discharging Nurse: [facility] Unit/Room#: 2087/2087-01  Discharging Unit Phone Number: ***    Emergency Contact:   Extended Emergency Contact Information  Primary Emergency Contact: Vernell Villatoro  Home Phone: [phone]  Relation: Parent  Secondary Emergency Contact: Ari Mathew  Home Phone: [phone]  Relation: Other    Past Surgical History:  Past Surgical History:   Procedure Laterality Date    TONSILLECTOMY         Immunization History: There is no immunization history on file for this patient.     Active Problems:  Patient Active Problem List   Diagnosis Code    Hyperbilirubinemia O95.2    Alcoholic hepatitis without ascites K70.10    Unintentional weight loss R63.4    Hypotension due to hypovolemia I95.89, E86.1    Anemia D64.9    Hyponatremia E87.1    Hypokalemia E87.6    Alcohol abuse F10.10       Isolation/Infection:   Isolation          No Isolation        Patient Infection Status     None to display          Nurse Assessment:  Last Vital Signs: /79   Pulse 73   Temp 97.6 °F (36.4 °C) (Oral)   Resp 16   Ht 6' 1\" (1.854 m)   Wt 184 lb 1.4 oz (83.5 kg)   SpO2 93%   BMI 24.29 kg/m²     Last documented pain score (0-10 scale): Pain Level: 0  Last Weight:   Wt Readings from Last 1 Encounters:   20 184 lb 1.4 oz (83.5 kg)     Mental Status:  {IP PT MENTAL STATUS:55135}    IV Access:  [address] [employer] IV ACCESS:810140158}    Nursing Mobility/ADLs:  Walking   {CHP DME COGN:108106028}  Transfer  {CHP DME TLHR:340487881}  Bathing  {CHP DME ZOOJ:438473523}  Dressing  {CHP DME MKFT:078272326}  Toileting  {CHP DME TWUL:814724531}  Feeding  {CHP DME MKPQ:682381046}  Med Admin  {CHP DME ERXA:107352076}  Med Delivery   { TASHA MED Delivery:558752960}    Wound Care Documentation and Therapy:        Elimination:  Continence:   · Bowel: {YES / BJ:59523}  · Bladder: {YES / TC:60290}  Urinary Catheter: {Urinary Catheter:211067237}   Colostomy/Ileostomy/Ileal Conduit: {YES / MS:09432}       Date of Last BM: ***    Intake/Output Summary (Last 24 hours) at 3/27/2020 1204  Last data filed at 3/27/2020 1151  Gross per 24 hour   Intake 3553.75 ml   Output 1050 ml   Net 2503.75 ml     I/O last 3 completed shifts:   In: 1435 [I.V.:1435]  Out: 550 [Urine:550]    Safety Concerns:     [employer] Safety Concerns:200678764}    Impairments/Disabilities:      [address] [employer] Impairments/Disabilities:871703970}    Nutrition Therapy:  Current Nutrition Therapy:   [employer] Diet List:599520980}    Routes of Feeding: {[facility] DME Other Feedings:965997342}  Liquids: {Slp liquid thickness:68153}  Daily Fluid Restriction: {CHP DME Yes amt example:134963343}  Last Modified Barium Swallow with Video (Video Swallowing Test): {Done Not Done JDSF:677855768}    Treatments at the Time of Hospital Discharge:   Respiratory Treatments: ***  Oxygen Therapy:  {Therapy; copd oxygen:83025}  Ventilator:    { KIRSTIN Vent MKIO:250035252}    Rehab Therapies: {THERAPEUTIC INTERVENTION:9653224754}  Weight Bearing Status/Restrictions: [address] Maicol  Weight Bearin}  Other Medical Equipment (for information only, NOT a DME order):  {EQUIPMENT:632066515}  Other Treatments: ***    Patient's personal belongings (please select all that are sent with patient):  {LYNDSEY DME Belongings:358485637}    RN SIGNATURE:  {Esignature:537562691}    CASE MANAGEMENT/SOCIAL WORK

## 2020-03-27 NOTE — CONSULTS
Intimate partner violence     Fear of current or ex partner: Not on file     Emotionally abused: Not on file     Physically abused: Not on file     Forced sexual activity: Not on file   Other Topics Concern    Not on file   Social History Narrative    Not on file         REVIEW OF SYSTEMS: A 14-point review of systems was obtained and pertinent positives andnegatives were enumerated above in the history of present illness. All other reviewed systems / symptoms were negative. Review of Systems      PHYSICAL EXAMINATION: Vital signs reviewed per the nursing documentation. BP 92/75   Pulse 81   Temp 99.5 °F (37.5 °C) (Axillary)   Resp 16   Ht 6' 1\" (1.854 m)   Wt 184 lb 1.4 oz (83.5 kg)   SpO2 90%   BMI 24.29 kg/m²    [unfilled]   Body mass index is 24.29 kg/m². General:  A O x 3 in NAD icteric   Psych: . Normal affect. Mentation normal   HEENT: PERRLA. Clear conjunctivae and sclerae. Moist oral mucosae, no lesions orulcers. The neck is supple, without lymphadenopathy or jugular venous distension. No masses. Normal thyroid. Cardiovascular: S1 S2 RRR no rubs or murmurs. Pulmonary: clear BL. No accessory muscle usage. Exam: Soft, NT ND, no hepato or spleno megaly, +BS, no ascites. No groin masses or lymphadenopathy. Extremities: No edema. Skin: Warm skin. No skin rash. No spider nevi palmar erythema naildystrophy. Joint: No joint swelling or deformity. Neurological: intact sensory. DTR+.  No asterixis     LABORATORY DATA: Reviewed   Lab Results   Component Value Date    WBC 4.8 03/27/2020    HGB 9.5 (L) 03/27/2020    HCT 27.1 (L) 03/27/2020    .6 (H) 03/27/2020     03/27/2020     (L) 03/27/2020    K 3.1 (L) 03/27/2020     03/27/2020    CO2 24 03/27/2020    BUN 14 03/27/2020    CREATININE <0.40 (L) 03/27/2020    LABALBU 2.3 (L) 03/27/2020    BILITOT 22.28 (HH) 03/27/2020    ALKPHOS 154 (H) 03/27/2020     (H) 03/27/2020     (H) 03/27/2020 INR 1.1 03/26/2020      Lab Results   Component Value Date    RBC 2.38 (L) 03/27/2020    HGB 9.5 (L) 03/27/2020    .6 (H) 03/27/2020    MCH 39.7 (H) 03/27/2020    MCHC 34.9 03/27/2020    RDW 20.8 (H) 03/27/2020    MPV 7.8 03/27/2020    BASOPCT 2 03/27/2020    LYMPHSABS 1.63 03/27/2020    MONOSABS 0.38 03/27/2020    NEUTROABS 2.59 03/27/2020    EOSABS 0.10 03/27/2020    BASOSABS 0.10 03/27/2020          DIAGNOSTIC TESTING:   Xr Chest Standard (2 Vw)    Result Date: 3/26/2020  EXAMINATION: TWO XRAY VIEWS OF THE CHEST 3/26/2020 8:35 pm COMPARISON: None. HISTORY: ORDERING SYSTEM PROVIDED HISTORY: aspiration risk TECHNOLOGIST PROVIDED HISTORY: aspiration risk Reason for Exam: PT CO cough with SOB, fatigue, weakness X several days. Acuity: Acute Type of Exam: Initial FINDINGS: The cardiomediastinal silhouette is unremarkable. Linear focus of scarring or atelectasis in the right middle lobe. The lungs otherwise are clear. No infiltrate, pleural fluid or evidence of overt failure. No acute osseous findings. No acute cardiopulmonary disease. Linear scarring or atelectasis in the right middle lobe. Ct Abdomen Pelvis W Iv Contrast Additional Contrast? None    Result Date: 3/26/2020  EXAMINATION: CT OF THE ABDOMEN AND PELVIS WITH CONTRAST 3/26/2020 3:25 pm TECHNIQUE: CT of the abdomen and pelvis was performed with the administration of intravenous contrast. Multiplanar reformatted images are provided for review. Dose modulation, iterative reconstruction, and/or weight based adjustment of the mA/kV was utilized to reduce the radiation dose to as low as reasonably achievable. COMPARISON: Right upper quadrant ultrasound today. HISTORY: ORDERING SYSTEM PROVIDED HISTORY: painless jaundice TECHNOLOGIST PROVIDED HISTORY: painless jaundice Reason for Exam: painless jaundice Acuity: Acute Type of Exam: Initial FINDINGS: Lower Chest: Subsegmental atelectasis in the lung bases.  Organs: Marked hepatic steatosis and

## 2020-03-27 NOTE — PROGRESS NOTES
Nutrition Assessment    Type and Reason for Visit: Consult(Poor intake/ appetite)    Nutrition Recommendations: Continue Low Sodium, Low-Fiber, Low-Fat diet. Nutrition Assessment: Patient appears to be severely malnourished as evidence by weight loss and poor appetite for about a month prior to admission. Patient is admitted to [facility] and has a medical history for alcohol abuse. Patient states that he is feeling much better and ate well at for dinner last night (3/26) and breakfast this morning. Continue current diet and monitor p.o intakes and labs. Malnutrition Assessment:  · Malnutrition Status: Meets the criteria for severe malnutrition  · Context: Acute illness or injury  · Findings of the 6 clinical characteristics of malnutrition (Minimum of 2 out of 6 clinical characteristics is required to make the diagnosis of moderate or severe Protein Calorie Malnutrition based on AND/ASPEN Guidelines):  1. Energy Intake-Less than or equal to 50% of estimated energy requirement, Greater than or equal to 1 month    2. Weight Loss-5% loss or greater, in 1 month  3. Fat Loss-Unable to assess,    4. Muscle Loss-Unable to assess,    5. Fluid Accumulation-No significant fluid accumulation, Extremities  6.  Strength-Not measured    Nutrition Risk Level: High    Nutrient Needs:  · Estimated Daily Total Kcal: 4547-7035 kcal based on 25-28 kcal/kg of admission weight   · Estimated Daily Protein (g): 100-117 gm of protein based on 1.2-1.4 gm/kg of admission weight     Nutrition Diagnosis:   · Problem: Altered nutrition-related lab values  · Etiology: related to Alteration in GI function     Signs and symptoms:  as evidenced by Lab values(jaundice, elevated GI profile labs)    Objective Information:  · Nutrition-Focused Physical Findings: No edema.  Jaundice   · Current Nutrition Therapies:  · Oral Diet Orders: 2gm Sodium, Low Fat, Low Fiber   · Oral Diet intake: %  · Oral Nutrition Supplement (ONS)

## 2020-03-27 NOTE — PROGRESS NOTES
Exam: Initial FINDINGS: The cardiomediastinal silhouette is unremarkable. Linear focus of scarring or atelectasis in the right middle lobe. The lungs otherwise are clear. No infiltrate, pleural fluid or evidence of overt failure. No acute osseous findings. No acute cardiopulmonary disease. Linear scarring or atelectasis in the right middle lobe. Ct Abdomen Pelvis W Iv Contrast Additional Contrast? None    Result Date: 3/26/2020  EXAMINATION: CT OF THE ABDOMEN AND PELVIS WITH CONTRAST 3/26/2020 3:25 pm TECHNIQUE: CT of the abdomen and pelvis was performed with the administration of intravenous contrast. Multiplanar reformatted images are provided for review. Dose modulation, iterative reconstruction, and/or weight based adjustment of the mA/kV was utilized to reduce the radiation dose to as low as reasonably achievable. COMPARISON: Right upper quadrant ultrasound today. HISTORY: ORDERING SYSTEM PROVIDED HISTORY: painless jaundice TECHNOLOGIST PROVIDED HISTORY: painless jaundice Reason for Exam: painless jaundice Acuity: Acute Type of Exam: Initial FINDINGS: Lower Chest: Subsegmental atelectasis in the lung bases. Organs: Marked hepatic steatosis and hepatomegaly measuring 23 cm. No morphologic evidence for cirrhosis or focal liver lesion. The gallbladder is without acute abnormality. Calcification is noted near the cystic duct, unclear whether this is in the cystic duct or an adjacent calcified lymph node. No evidence for biliary dilatation. The pancreas, spleen, adrenals and kidneys reveal no acute findings. GI/Bowel: There is no bowel dilatation or wall thickening identified. Diverticulosis. Pelvis: No acute findings. The bladder is well distended. Peritoneum/Retroperitoneum: Few mildly prominent periportal lymph nodes are noted. No free air. No ascites. The aorta is normal in caliber. Bones/Soft Tissues: Moderately severe disc disease and lower lumbar facet arthropathy.   No acute abnormality

## 2020-03-28 VITALS
HEART RATE: 84 BPM | BODY MASS INDEX: 24.4 KG/M2 | SYSTOLIC BLOOD PRESSURE: 90 MMHG | RESPIRATION RATE: 16 BRPM | OXYGEN SATURATION: 95 % | DIASTOLIC BLOOD PRESSURE: 59 MMHG | WEIGHT: 184.08 LBS | TEMPERATURE: 98.5 F | HEIGHT: 73 IN

## 2020-03-28 LAB
ABSOLUTE EOS #: 0.07 K/UL (ref 0–0.4)
ABSOLUTE IMMATURE GRANULOCYTE: ABNORMAL K/UL (ref 0–0.3)
ABSOLUTE LYMPH #: 2.09 K/UL (ref 1–4.8)
ABSOLUTE MONO #: 0.5 K/UL (ref 0.1–1.3)
ALBUMIN SERPL-MCNC: 2.3 G/DL (ref 3.5–5.2)
ALBUMIN/GLOBULIN RATIO: ABNORMAL (ref 1–2.5)
ALP BLD-CCNC: 160 U/L (ref 40–129)
ALT SERPL-CCNC: 113 U/L (ref 5–41)
ANION GAP SERPL CALCULATED.3IONS-SCNC: 12 MMOL/L (ref 9–17)
AST SERPL-CCNC: 325 U/L
BASOPHILS # BLD: 1 % (ref 0–2)
BASOPHILS ABSOLUTE: 0.07 K/UL (ref 0–0.2)
BILIRUB SERPL-MCNC: 21.12 MG/DL (ref 0.3–1.2)
BUN BLDV-MCNC: 12 MG/DL (ref 6–20)
BUN/CREAT BLD: ABNORMAL (ref 9–20)
CALCIUM SERPL-MCNC: 9 MG/DL (ref 8.6–10.4)
CHLORIDE BLD-SCNC: 98 MMOL/L (ref 98–107)
CO2: 24 MMOL/L (ref 20–31)
CREAT SERPL-MCNC: <0.4 MG/DL (ref 0.7–1.2)
DIFFERENTIAL TYPE: ABNORMAL
EOSINOPHILS RELATIVE PERCENT: 1 % (ref 0–4)
GFR AFRICAN AMERICAN: ABNORMAL ML/MIN
GFR NON-AFRICAN AMERICAN: ABNORMAL ML/MIN
GFR SERPL CREATININE-BSD FRML MDRD: ABNORMAL ML/MIN/{1.73_M2}
GFR SERPL CREATININE-BSD FRML MDRD: ABNORMAL ML/MIN/{1.73_M2}
GLUCOSE BLD-MCNC: 105 MG/DL (ref 70–99)
HCT VFR BLD CALC: 26 % (ref 41–53)
HEMOGLOBIN: 9.3 G/DL (ref 13.5–17.5)
IMMATURE GRANULOCYTES: ABNORMAL %
LIVER-KIDNEY MICROSOMAL AB: NORMAL
LYMPHOCYTES # BLD: 29 % (ref 24–44)
MAGNESIUM: 1.5 MG/DL (ref 1.6–2.6)
MCH RBC QN AUTO: 40 PG (ref 26–34)
MCHC RBC AUTO-ENTMCNC: 35.7 G/DL (ref 31–37)
MCV RBC AUTO: 112.3 FL (ref 80–100)
MITOCHONDRIAL ANTIBODY: 3.6 UNITS (ref 0–20)
MONOCYTES # BLD: 7 % (ref 1–7)
MORPHOLOGY: ABNORMAL
NRBC AUTOMATED: ABNORMAL PER 100 WBC
PATHOLOGIST REVIEW: NORMAL
PDW BLD-RTO: 20.9 % (ref 11.5–14.9)
PLATELET # BLD: 185 K/UL (ref 150–450)
PLATELET ESTIMATE: ABNORMAL
PMV BLD AUTO: 8.4 FL (ref 6–12)
POTASSIUM SERPL-SCNC: 3.4 MMOL/L (ref 3.7–5.3)
RBC # BLD: 2.31 M/UL (ref 4.5–5.9)
RBC # BLD: ABNORMAL 10*6/UL
SEG NEUTROPHILS: 62 % (ref 36–66)
SEGMENTED NEUTROPHILS ABSOLUTE COUNT: 4.47 K/UL (ref 1.3–9.1)
SMOOTH MUSCLE ANTIBODY: 7 UNITS (ref 0–19)
SODIUM BLD-SCNC: 134 MMOL/L (ref 135–144)
TOTAL PROTEIN: 4.5 G/DL (ref 6.4–8.3)
WBC # BLD: 7.2 K/UL (ref 3.5–11)
WBC # BLD: ABNORMAL 10*3/UL

## 2020-03-28 PROCEDURE — 6370000000 HC RX 637 (ALT 250 FOR IP): Performed by: STUDENT IN AN ORGANIZED HEALTH CARE EDUCATION/TRAINING PROGRAM

## 2020-03-28 PROCEDURE — 97162 PT EVAL MOD COMPLEX 30 MIN: CPT

## 2020-03-28 PROCEDURE — 80053 COMPREHEN METABOLIC PANEL: CPT

## 2020-03-28 PROCEDURE — 83735 ASSAY OF MAGNESIUM: CPT

## 2020-03-28 PROCEDURE — 6360000002 HC RX W HCPCS: Performed by: STUDENT IN AN ORGANIZED HEALTH CARE EDUCATION/TRAINING PROGRAM

## 2020-03-28 PROCEDURE — 85025 COMPLETE CBC W/AUTO DIFF WBC: CPT

## 2020-03-28 PROCEDURE — 97530 THERAPEUTIC ACTIVITIES: CPT

## 2020-03-28 PROCEDURE — 6360000002 HC RX W HCPCS: Performed by: INTERNAL MEDICINE

## 2020-03-28 PROCEDURE — 36415 COLL VENOUS BLD VENIPUNCTURE: CPT

## 2020-03-28 RX ORDER — MAGNESIUM SULFATE 1 G/100ML
1 INJECTION INTRAVENOUS
Status: DISPENSED | OUTPATIENT
Start: 2020-03-28 | End: 2020-03-28

## 2020-03-28 RX ORDER — MIDODRINE HYDROCHLORIDE 10 MG/1
10 TABLET ORAL 3 TIMES DAILY
Qty: 30 TABLET | Refills: 0 | Status: SHIPPED | OUTPATIENT
Start: 2020-03-28 | End: 2020-05-12

## 2020-03-28 RX ADMIN — MAGNESIUM SULFATE HEPTAHYDRATE 1 G: 1 INJECTION, SOLUTION INTRAVENOUS at 08:32

## 2020-03-28 RX ADMIN — MIDODRINE HYDROCHLORIDE 10 MG: 10 TABLET ORAL at 08:32

## 2020-03-28 RX ADMIN — FAMOTIDINE 20 MG: 20 TABLET ORAL at 08:33

## 2020-03-28 RX ADMIN — ENOXAPARIN SODIUM 40 MG: 40 INJECTION SUBCUTANEOUS at 08:32

## 2020-03-28 RX ADMIN — FOLIC ACID 1 MG: 1 TABLET ORAL at 08:32

## 2020-03-28 RX ADMIN — POTASSIUM CHLORIDE 40 MEQ: 1500 TABLET, EXTENDED RELEASE ORAL at 09:42

## 2020-03-28 RX ADMIN — MIDODRINE HYDROCHLORIDE 10 MG: 10 TABLET ORAL at 14:49

## 2020-03-28 RX ADMIN — MULTIPLE VITAMINS W/ MINERALS TAB 1 TABLET: TAB at 08:33

## 2020-03-28 RX ADMIN — THIAMINE HCL TAB 100 MG 100 MG: 100 TAB at 08:33

## 2020-03-28 ASSESSMENT — PAIN SCALES - GENERAL
PAINLEVEL_OUTOF10: 0
PAINLEVEL_OUTOF10: 0

## 2020-03-28 NOTE — DISCHARGE SUMMARY
resolved,  Blood pressure around 90s over 50s, patient has been given Midodrin multiple doses in order to increase his BP, patient discharged on midodrine 10 mg 3 times a day for blood pressure    GI consulted: Patient should follow-up after 1 week with GI, CMP and INR in 1 week, follow-up with     Significant therapeutic interventions: Lab tests, GI consulted    Significant Diagnostic Studies:   Labs / Micro:  CBC:   Lab Results   Component Value Date    WBC 7.2 03/28/2020    RBC 2.31 03/28/2020    HGB 9.3 03/28/2020    HCT 26.0 03/28/2020    .3 03/28/2020    MCH 40.0 03/28/2020    MCHC 35.7 03/28/2020    RDW 20.9 03/28/2020     03/28/2020     BMP:    Lab Results   Component Value Date    GLUCOSE 105 03/28/2020     03/28/2020    K 3.4 03/28/2020    CL 98 03/28/2020    CO2 24 03/28/2020    ANIONGAP 12 03/28/2020    BUN 12 03/28/2020    CREATININE <0.40 03/28/2020    BUNCRER NOT REPORTED 03/28/2020    CALCIUM 9.0 03/28/2020    LABGLOM CANNOT BE CALCULATED 03/28/2020    GFRAA CANNOT BE CALCULATED 03/28/2020    GFR      03/28/2020    GFR NOT REPORTED 03/28/2020     HFP:    Lab Results   Component Value Date    PROT 4.5 03/28/2020     CMP:    Lab Results   Component Value Date    GLUCOSE 105 03/28/2020     03/28/2020    K 3.4 03/28/2020    CL 98 03/28/2020    CO2 24 03/28/2020    BUN 12 03/28/2020    CREATININE <0.40 03/28/2020    ANIONGAP 12 03/28/2020    ALKPHOS 160 03/28/2020     03/28/2020     03/28/2020    BILITOT 21.12 03/28/2020    LABALBU 2.3 03/28/2020    ALBUMIN NOT REPORTED 03/28/2020    LABGLOM CANNOT BE CALCULATED 03/28/2020    GFRAA CANNOT BE CALCULATED 03/28/2020    GFR      03/28/2020    GFR NOT REPORTED 03/28/2020    PROT 4.5 03/28/2020    CALCIUM 9.0 03/28/2020     PT/INR:    Lab Results   Component Value Date    PROTIME 13.9 03/26/2020    INR 1.1 03/26/2020     PTT:   Lab Results   Component Value Date    APTT 31.6 03/26/2020     FLP:  No results found unclear whether this is in the cystic duct or an adjacent calcified lymph node. No evidence for biliary dilatation. The pancreas, spleen, adrenals and kidneys reveal no acute findings. GI/Bowel: There is no bowel dilatation or wall thickening identified. Diverticulosis. Pelvis: No acute findings. The bladder is well distended. Peritoneum/Retroperitoneum: Few mildly prominent periportal lymph nodes are noted. No free air. No ascites. The aorta is normal in caliber. Bones/Soft Tissues: Moderately severe disc disease and lower lumbar facet arthropathy. No acute abnormality identified. 1.  No acute inflammatory process identified. 2.  Hepatomegaly with marked steatosis. 3.  Punctate calcification near the region of the cystic duct. No secondary signs of acute cholecystitis or biliary dilatation. In the setting of painless jaundice, this is unlikely to represent a cystic duct stone. 4.  Diverticulosis. Us Gallbladder Ruq    Result Date: 3/26/2020  EXAMINATION: RIGHT UPPER QUADRANT ULTRASOUND 3/26/2020 2:53 pm COMPARISON: None. HISTORY: ORDERING SYSTEM PROVIDED HISTORY: new jaundice TECHNOLOGIST PROVIDED HISTORY: new jaundice FINDINGS: LIVER:  The liver is enlarged and demonstrates diffuse increased heterogeneous cord parenchymal echogenicity. There is subtle surface contour nodularity of the liver. The right hepatic length is 22 cm. No intrahepatic biliary ductal dilatation. BILIARY SYSTEM:  Layering material in the gallbladder consistent with sludge. Otherwise, no gallstones, pericholecystic fluid, wall thickening or stones. Negative sonographic Luque's sign. Common bile duct is within normal limits measuring 4 mm. RIGHT KIDNEY: The right kidney is grossly unremarkable without evidence of hydronephrosis. PANCREAS:  Visualized portions of the pancreas are unremarkable. OTHER: No evidence of right upper quadrant ascites. Overall findings suggestive of underlying cirrhosis. Gallbladder sludge.   No other findings to suggest acute cholecystitis. Mri Abdomen Wo Contrast Mrcp    Result Date: 3/27/2020  EXAMINATION: MRI OF THE ABDOMEN WITHOUT CONTRAST AND MRCP 3/26/2020 7:08 pm TECHNIQUE: Multiplanar multisequence MRI of the abdomen was performed without the administration of intravenous contrast.  After initial T2 axial and coronal images, thick slab, thin slab and 3D coronal MRCP sequences were obtained without the administration of intravenous contrast.  MIP images are provided for review. COMPARISON: CT abdomen, pelvis and gallbladder ultrasound 03/26/2020 HISTORY: ORDERING SYSTEM PROVIDED HISTORY: new jaundice, hyperbilirubinemia TECHNOLOGIST PROVIDED HISTORY: new jaundice, hyperbilirubinemia Reason for Exam: New jaundice, hyperbilirubinemia Acuity: Acute Type of Exam: Initial Additional signs and symptoms: Patient arrives to  ED via EMS on 03/26/2020 with Fatigue, Jaundice and Hypotension. Relevant Medical/Surgical History: No relevant surgical hx to area of interest. FINDINGS: Liver: The liver is enlarged with the right hepatic lobe measuring 22.5 cm craniocaudal.  Parenchymal signal is compatible with steatosis. No focal lesion. Gallbladder: Normal gallbladder without evidence of cholelithiasis, wall thickening or pericholecystic fluid. Bile Ducts: No bile duct dilatation. No evidence of choledocholithiasis. Pancreatic Duct: Nondilated. Other:  Remaining solid organs, including the kidneys, spleen and pancreas are unremarkable. No bowel obstruction. No abdominal ascites. Included portions of the urinary bladder are unremarkable. Normal bone marrow signal. No signal abnormality within the lungs. Normal heart size appreciated. No evidence of cholelithiasis, choledocholithiasis, cholecystitis or bile duct dilatation. The previously seen calcification in the region of the cystic duct is not discretely identified on this exam.  Some sequences are limited by motion artifact.  Hepatomegaly with steatosis. Consultations:    Consults:     Final Specialist Recommendations/Findings:   IP CONSULT TO GI  IP CONSULT TO INTERNAL MEDICINE  IP CONSULT TO SOCIAL WORK  IP CONSULT TO DIETITIAN  IP CONSULT TO Jose      The patient was seen and examined on day of discharge and this discharge summary is in conjunction with any daily progress note from day of discharge. Discharge plan:       Disposition: Home    Physician Follow Up:     [facility]  [address]  [address] [place] [id number] [phone]    Call this office to establish a primary care doctor. Abbie Marques MD  [address] [facility] [address] [facility]  [phone]    In 1 week      Herberth Castro, [address] [facility] [address] [facility] [address] [facility]  [phone]    In 2 weeks         Requiring Further Evaluation/Follow Up POST HOSPITALIZATION/Incidental Findings: n/a    Diet: Low-salt diet    Activity: As tolerated    Instructions to Patient: - Take all medications as prescribed  - Follow up with PCP   - In case of any worsening condition please visit Emergency Room. Discharge Medications:      Medication List      START taking these medications    midodrine 10 MG tablet  Commonly known as:  PROAMATINE  Take 1 tablet by mouth 3 times daily for 10 days        CONTINUE taking these medications    Advil -25 MG Caps  Generic drug:  Ibuprofen-diphenhydrAMINE HCl           Where to Get Your Medications      These medications were sent to [address] [place] [id number]    Phone:  [phone]   · midodrine 10 MG tablet         Time Spent on discharge is  31 mins in patient examination, evaluation, counseling as well as medication reconciliation, prescriptions for required medications, discharge plan and follow up.     Electronically signed by   Mickie Grover MD  3/28/2020  1:47 PM      Thank you Dr. Jennifer Lou primary care provider on

## 2020-03-28 NOTE — PROGRESS NOTES
Physical Therapy    Facility/Department: [facility] PROGRESSIVE CARE  Initial Assessment    NAME: Cirilo Meyers  : 1960  MRN: [id number]    Date of Service: 3/28/2020    Discharge Recommendations:  Patient would benefit from continued therapy after discharge        Assessment   Neurological  Neurological (WDL): Within Defined Limits  Body structures, Functions, Activity limitations: Decreased strength;Decreased balance;Decreased functional mobility   Assessment: decline in functional status due to dizziness and weakness fro medical condition  Treatment Diagnosis: difficulty walking  Specific instructions for Next Treatment: ther exs and ther activities as tolerated  Prognosis: Fair  Decision Making: Medium Complexity  Exam: MMT, ROM and functional mobility testing  Clinical Presentation: admitted with fatigue, jaundice and hypotension  REQUIRES PT FOLLOW UP: Yes  Activity Tolerance  Activity Tolerance: Patient limited by fatigue(and dizziness)       Patient Diagnosis(es): The primary encounter diagnosis was Hyperbilirubinemia. Diagnoses of Alcoholic hepatitis without ascites, Hypokalemia, and Dehydration were also pertinent to this visit. has no past medical history on file. has a past surgical history that includes Tonsillectomy. Restrictions  Restrictions/Precautions  Restrictions/Precautions: Fall Risk(feels dizzy and weak with position changes)  Vision/Hearing  Vision: Within Functional Limits  Hearing: Within functional limits     Subjective  General  Chart Reviewed: Yes  Patient assessed for rehabilitation services?: Yes  Family / Caregiver Present: No  Referring Practitioner: Dr Jeanmarie Cervantes  Referral Date : 20  Diagnosis: Alcoholic Hepatitis  Follows Commands: Within Functional Limits  General Comment  Comments: supine in bed  Subjective  Subjective: Pt staed, \" I cannot really do much. \"  Pain Screening  Patient Currently in Pain: Denies  Vital Signs  Patient Currently in Pain: Denies  Pre Treatment Pain Screening  Pain at present: 0  Scale Used: Numeric Score    Orientation  Orientation  Overall Orientation Status: Within Functional Limits  Social/Functional History  Social/Functional History  Lives With: Alone  Type of Home: House  Home Layout: Two level, Performs ADL's on one level, Able to Live on Main level with bedroom/bathroom  Home Access: Stairs to enter without rails  Entrance Stairs - Number of Steps: 3 steps at entry   Bathroom Shower/Tub: Walk-in shower  Bathroom Toilet: Standard  Bathroom Accessibility: Accessible  Home Equipment: (No MR-DME )  Receives Help From: Friend(s)  ADL Assistance: Independent  Homemaking Assistance: Independent  Homemaking Responsibilities: Yes  Ambulation Assistance: Independent  Transfer Assistance: Independent  Active : Yes  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  Leisure & Hobbies: 2 pet dogs ( [employer])   Cognition   Cognition  Overall Cognitive Status: WFL    Objective          AROM RLE (degrees)  RLE AROM: WFL  AROM LLE (degrees)  LLE AROM : WFL  AROM RUE (degrees)  RUE AROM : WFL  AROM LUE (degrees)  LUE AROM : WFL  Strength RLE  Strength RLE: WFL  Comment: grossly 4/5  Strength LLE  Strength LLE: WFL  Comment: grossly 4/5  Strength RUE  Strength RUE: WFL  Comment: grossly 4/5  Strength LUE  Strength LUE: WFL  Comment: grossly 4/5     Sensation  Overall Sensation Status: WFL  Bed mobility  Scooting: Independent  Transfers  Sit to Stand: Stand by assistance  Stand to sit: Stand by assistance  Ambulation  Ambulation?: Yes  Ambulation 1  Surface: level tile  Device: No Device  Assistance: Stand by assistance  Gait Deviations: Slow [place];Decreased step length(reports feeling dizzy when walking)  Distance: 20 feet ( to bathroom and back)  Stairs/Curb  Stairs?: No     Balance  Posture: Fair  Sitting - Static: Fair  Sitting - Dynamic: Fair  Standing - Static: Fair  Standing - Dynamic: Fair;-        Plan Plan  Times per week: 5-6  Times per day: Daily  Plan weeks: 2  Specific instructions for Next Treatment: ther exs and ther activities as tolerated  Current Treatment Recommendations: Strengthening, Balance Training, Functional Mobility Training, Transfer Training, Gait Training  Safety Devices  Type of devices: Call light within reach, Nurse notified, Left in bed  Restraints  Initially in place: No                                   AM-PAC Score     AM-PAC Inpatient Mobility without Stair Climbing Raw Score : 17 (03/28/20 1237)  AM-PAC Inpatient without Stair Climbing T-Scale Score : 48.47 (03/28/20 1237)  Mobility Inpatient CMS 0-100% Score: 32.72 (03/28/20 1237)  Mobility Inpatient without Stair CMS G-Code Modifier : Finn Wilde (03/28/20 1237)       Goals  Short term goals  Time Frame for Short term goals: 5 sessions  Short term goal 1: Improve strength to 5/5 b LE  Short term goal 2:  Independent in sit to stand  Short term goal 3: Improve sitting and standing balance to good  Long term goals  Long term goal 1: Independent ambulation 120 ft without any assistive device  Patient Goals   Patient goals : return home       Therapy Time   Individual Concurrent Group Co-treatment   Time In 1110         Time Out Sreekanth PT

## 2020-03-28 NOTE — PROGRESS NOTES
[address] [facility] [place]    PROGRESS NOTE             3/28/2020    7:46 AM    Name:   Crista Milian  MRN:     [id number]     Acct:      [de-identified]   Room:   2087/2087-01   Day:  2  Admit Date:  3/26/2020  2:20 PM    PCP:  No primary care provider on file. Code Status:  Full Code    Subjective:     C/C:   Chief Complaint   Patient presents with    Fatigue    Jaundice    Hypotension     Interval History Status: not changed. Patient has been seen and examined at the bedside, no major event happened over the night, patient was cleared yesterday from GI status post to be discharged however his blood pressure trended down around 90s over 50s, patient has been given multiple doses of Midodrin, 5 mg twice and 1 dose of 10 mg, his potassium also was low, he is already on potassium sliding scale    Brief History:     See H&P    Review of Systems:     Review of Systems  Constitutional: Positive for appetite change, fatigue and unexpected weight change. Negative for chills, diaphoresis and fever. HENT: Negative for congestion, rhinorrhea, sinus pressure, sinus pain and sore throat.    Eyes: Negative for visual disturbance. Respiratory: Negative for cough, shortness of breath and wheezing.    Cardiovascular: Negative for chest pain, palpitations and leg swelling. Gastrointestinal: Negative for blood in stool, constipation, diarrhea, nausea and vomiting. Genitourinary: Negative for difficulty urinating and dysuria. Musculoskeletal: Negative for arthralgias, back pain, joint swelling and neck stiffness. Neurological: Positive for weakness. Negative for dizziness, light-headedness and headaches. Psychiatric/Behavioral: Negative for dysphoric mood and suicidal ideas    Medications: Allergies:     Allergies   Allergen Reactions    Pcn [Penicillins]      Unknown reaction       Current Meds:   Scheduled Meds:    midodrine  10 mg Oral TID    sodium chloride flush  10 mL Intravenous 2 times per day    enoxaparin  40 mg Subcutaneous Daily    sodium chloride flush  10 mL Intravenous 2 times per day    famotidine  20 mg Oral BID    therapeutic multivitamin-minerals  1 tablet Oral Daily    folic acid  1 mg Oral Daily    thiamine  100 mg Oral Daily     Continuous Infusions:   PRN Meds: sodium chloride flush, sodium chloride flush, sodium chloride flush, polyethylene glycol, promethazine **OR** ondansetron, potassium chloride **OR** potassium alternative oral replacement **OR** potassium chloride    Data:     Past Medical History:   has no past medical history on file. Social History:   reports that he has never smoked. He does not have any smokeless tobacco history on file. He reports previous alcohol use. He reports that he does not use drugs. Family History: History reviewed. No pertinent family history. Vitals:  BP (!) 90/59   Pulse 79   Temp 98.3 °F (36.8 °C) (Oral)   Resp 16   Ht 6' 1\" (1.854 m)   Wt 184 lb 1.4 oz (83.5 kg)   SpO2 100%   BMI 24.29 kg/m²   Temp (24hrs), Av.8 °F (37.1 °C), Min:98.3 °F (36.8 °C), Max:99.5 °F (37.5 °C)    No results for input(s): POCGLU in the last 72 hours. I/O(24Hr): Intake/Output Summary (Last 24 hours) at 3/28/2020 0746  Last data filed at 3/27/2020 1829  Gross per 24 hour   Intake 2668.75 ml   Output 500 ml   Net 2168.75 ml       Labs:  [unfilled]    No results found for: SPECIAL  No results found for: CULTURE    [unfilled]    Radiology:    Xr Chest Standard (2 Vw)    Result Date: 3/26/2020  EXAMINATION: TWO XRAY VIEWS OF THE CHEST 3/26/2020 8:35 pm COMPARISON: None. HISTORY: ORDERING SYSTEM PROVIDED HISTORY: aspiration risk TECHNOLOGIST PROVIDED HISTORY: aspiration risk Reason for Exam: PT CO cough with SOB, fatigue, weakness X several days. Acuity: Acute Type of Exam: Initial FINDINGS: The cardiomediastinal silhouette is unremarkable.   Linear focus of scarring or atelectasis in the right middle lobe. The lungs otherwise are clear. No infiltrate, pleural fluid or evidence of overt failure. No acute osseous findings. No acute cardiopulmonary disease. Linear scarring or atelectasis in the right middle lobe. Ct Abdomen Pelvis W Iv Contrast Additional Contrast? None    Result Date: 3/26/2020  EXAMINATION: CT OF THE ABDOMEN AND PELVIS WITH CONTRAST 3/26/2020 3:25 pm TECHNIQUE: CT of the abdomen and pelvis was performed with the administration of intravenous contrast. Multiplanar reformatted images are provided for review. Dose modulation, iterative reconstruction, and/or weight based adjustment of the mA/kV was utilized to reduce the radiation dose to as low as reasonably achievable. COMPARISON: Right upper quadrant ultrasound today. HISTORY: ORDERING SYSTEM PROVIDED HISTORY: painless jaundice TECHNOLOGIST PROVIDED HISTORY: painless jaundice Reason for Exam: painless jaundice Acuity: Acute Type of Exam: Initial FINDINGS: Lower Chest: Subsegmental atelectasis in the lung bases. Organs: Marked hepatic steatosis and hepatomegaly measuring 23 cm. No morphologic evidence for cirrhosis or focal liver lesion. The gallbladder is without acute abnormality. Calcification is noted near the cystic duct, unclear whether this is in the cystic duct or an adjacent calcified lymph node. No evidence for biliary dilatation. The pancreas, spleen, adrenals and kidneys reveal no acute findings. GI/Bowel: There is no bowel dilatation or wall thickening identified. Diverticulosis. Pelvis: No acute findings. The bladder is well distended. Peritoneum/Retroperitoneum: Few mildly prominent periportal lymph nodes are noted. No free air. No ascites. The aorta is normal in caliber. Bones/Soft Tissues: Moderately severe disc disease and lower lumbar facet arthropathy. No acute abnormality identified. 1.  No acute inflammatory process identified. 2.  Hepatomegaly with marked steatosis.  3.  Punctate hyperbilirubinemia Reason for Exam: New jaundice, hyperbilirubinemia Acuity: Acute Type of Exam: Initial Additional signs and symptoms: Patient arrives to  ED via EMS on 03/26/2020 with Fatigue, Jaundice and Hypotension. Relevant Medical/Surgical History: No relevant surgical hx to area of interest. FINDINGS: Liver: The liver is enlarged with the right hepatic lobe measuring 22.5 cm craniocaudal.  Parenchymal signal is compatible with steatosis. No focal lesion. Gallbladder: Normal gallbladder without evidence of cholelithiasis, wall thickening or pericholecystic fluid. Bile Ducts: No bile duct dilatation. No evidence of choledocholithiasis. Pancreatic Duct: Nondilated. Other:  Remaining solid organs, including the kidneys, spleen and pancreas are unremarkable. No bowel obstruction. No abdominal ascites. Included portions of the urinary bladder are unremarkable. Normal bone marrow signal. No signal abnormality within the lungs. Normal heart size appreciated. No evidence of cholelithiasis, choledocholithiasis, cholecystitis or bile duct dilatation. The previously seen calcification in the region of the cystic duct is not discretely identified on this exam.  Some sequences are limited by motion artifact. Hepatomegaly with steatosis. Physical Examination:        Physical Exam  Vitals signs and nursing note reviewed. Constitutional:       General: He is not in acute distress.     Appearance: He is normal weight.      Comments: Patient is very jaundiced   HENT:      Head: Normocephalic and atraumatic.      Mouth/Throat:      Mouth: Mucous membranes are dry.      Pharynx: No oropharyngeal exudate or posterior oropharyngeal erythema. Eyes:      General: Scleral icterus present.      Extraocular Movements: Extraocular movements intact.      Pupils: Pupils are equal, round, and reactive to light. Cardiovascular:      Rate and Rhythm: Normal rate and regular rhythm.      Pulses: Normal pulses. with market steatosis, calcifications in the cystic duct  -MRI abdomen without contrast: No evidence of cholelithiasis, choledocholithiasis, cholecystitis or parietal duct dilation, hepatomegaly with steatosis  - Status post 3 L normal saline bolus in the ED  - Normal saline 125 cc an hour  - GI is following  - MRCP     #Acute hepatitis without cirrhosis secondary to alcohol versus autoimmune  - -->301--->325,. .... -->103-->113,. .... alk phos 178 -->154-->160  - Hepatitis panel: Nonreactive, HIV: Nonreactive, PRESTON:negative, sedimentation rate:46, CRP:18.9^, anti-smooth muscle:7nl, antimitochondrial, TSH: 0.9nl  -Serial plasmin: 25nl  - IgA: 603 nl  -Alpha-fetoprotein:1.6 nl  -Alpha-1 antitrypsin:146 nl  -GGT: 203 ^  - GI is following: He is clear from their standpoint to be discharged, he will follow-up outpatient  -PT: 13.9 NL, INR: 1.1 and L, PTT: 31.6     *Alcohol abuse  -Patient states he has been sober for 3 weeks  - CIWA protocol is discontinued  -Chest x-ray      *Malnutrition, poor oral intake  - Dietary consult: Low-sodium diet  - Low threshold for refeeding syndrome  - Monitor phosphorus and magnesium  -Transferrin: 130, iron: 165,     *Hypokalemia without EKG changes--- resolved  - EKG shows normal sinus rhythm  - Potassium 2.7--->3.4  -60 mEq given in the ED  -Potassium sliding scale     *Mild hyponatremia--- resolved  -Sodium 132-->134  -Normal saline 3 L bolus  -We will recheck     Code: Full  DVT: Lovenox  Diet: Low-sodium diet  GI: Pepcid 20 mg twice daily  Disposition: Edilia Barbosa MD  3/28/2020  7:46 AM

## 2020-03-30 LAB
CMV IGM: 0.4
CYTOMEGALOVIRUS IGG ANTIBODY: 0.1

## 2020-04-01 LAB
EBV EARLY ANTIGEN IGG: 57 U/ML
EBV INTERPRETATION: ABNORMAL
EBV NUCLEAR AG AB: 511 U/ML
EPSTEIN-BARR VCA IGG: 1434 U/ML
EPSTEIN-BARR VCA IGM: 109 U/ML

## 2020-04-08 ENCOUNTER — TELEPHONE (OUTPATIENT)
Dept: GASTROENTEROLOGY | Age: 60
End: 2020-04-08

## 2020-04-08 NOTE — TELEPHONE ENCOUNTER
Patient called and asked about a follow up appointment with Dr Sharon Maldonado. He states he was in the hospital with Jaundice and it started going away but today started coming back. Appointment made for Video on Monday   Writer reached out to Dr Sharon Maldonado and he stated to get some labs and he will be fine without Alcohol.

## 2020-04-13 ENCOUNTER — TELEPHONE (OUTPATIENT)
Dept: GASTROENTEROLOGY | Age: 60
End: 2020-04-13

## 2020-04-13 ENCOUNTER — VIRTUAL VISIT (OUTPATIENT)
Dept: GASTROENTEROLOGY | Age: 60
End: 2020-04-13

## 2020-04-13 PROCEDURE — 99441 PR PHYS/QHP TELEPHONE EVALUATION 5-10 MIN: CPT | Performed by: INTERNAL MEDICINE

## 2020-04-21 ENCOUNTER — HOSPITAL ENCOUNTER (OUTPATIENT)
Age: 60
Discharge: HOME | End: 2020-04-21

## 2020-04-21 LAB
ABSOLUTE EOS #: 0.24 K/UL (ref 0–0.4)
ABSOLUTE IMMATURE GRANULOCYTE: ABNORMAL K/UL (ref 0–0.3)
ABSOLUTE LYMPH #: 1.22 K/UL (ref 1–4.8)
ABSOLUTE MONO #: 0.65 K/UL (ref 0.1–1.3)
ALBUMIN SERPL-MCNC: 3.3 G/DL (ref 3.5–5.2)
ALBUMIN/GLOBULIN RATIO: ABNORMAL (ref 1–2.5)
ALP BLD-CCNC: 169 U/L (ref 40–129)
ALT SERPL-CCNC: 113 U/L (ref 5–41)
ANION GAP SERPL CALCULATED.3IONS-SCNC: 14 MMOL/L (ref 9–17)
AST SERPL-CCNC: 176 U/L
BASOPHILS # BLD: 2 % (ref 0–2)
BASOPHILS ABSOLUTE: 0.16 K/UL (ref 0–0.2)
BILIRUB SERPL-MCNC: 5.16 MG/DL (ref 0.3–1.2)
BILIRUBIN DIRECT: 2.89 MG/DL
BILIRUBIN, INDIRECT: 2.27 MG/DL (ref 0–1)
BUN BLDV-MCNC: 14 MG/DL (ref 6–20)
CALCIUM SERPL-MCNC: 9.1 MG/DL (ref 8.6–10.4)
CHLORIDE BLD-SCNC: 104 MMOL/L (ref 98–107)
CO2: 20 MMOL/L (ref 20–31)
CREAT SERPL-MCNC: 0.72 MG/DL (ref 0.7–1.2)
DIFFERENTIAL TYPE: ABNORMAL
EOSINOPHILS RELATIVE PERCENT: 3 % (ref 0–4)
GFR AFRICAN AMERICAN: >60 ML/MIN
GFR NON-AFRICAN AMERICAN: >60 ML/MIN
GFR SERPL CREATININE-BSD FRML MDRD: ABNORMAL ML/MIN/{1.73_M2}
GFR SERPL CREATININE-BSD FRML MDRD: ABNORMAL ML/MIN/{1.73_M2}
GLUCOSE BLD-MCNC: 113 MG/DL (ref 70–99)
HCT VFR BLD CALC: 35.7 % (ref 41–53)
HEMOGLOBIN: 11.8 G/DL (ref 13.5–17.5)
IGG: 1106 MG/DL (ref 700–1600)
IGM: 176 MG/DL (ref 40–230)
IMMATURE GRANULOCYTES: ABNORMAL %
INR BLD: 0.9
LYMPHOCYTES # BLD: 15 % (ref 24–44)
MCH RBC QN AUTO: 36.8 PG (ref 26–34)
MCHC RBC AUTO-ENTMCNC: 33.2 G/DL (ref 31–37)
MCV RBC AUTO: 111 FL (ref 80–100)
MONOCYTES # BLD: 8 % (ref 1–7)
MORPHOLOGY: ABNORMAL
NRBC AUTOMATED: ABNORMAL PER 100 WBC
PATHOLOGIST REVIEW: NORMAL
PDW BLD-RTO: 15.6 % (ref 11.5–14.9)
PLATELET # BLD: 353 K/UL (ref 150–450)
PLATELET ESTIMATE: ABNORMAL
PMV BLD AUTO: 7 FL (ref 6–12)
POTASSIUM SERPL-SCNC: 4.4 MMOL/L (ref 3.7–5.3)
PROTHROMBIN TIME: 12.4 SEC (ref 11.8–14.6)
RBC # BLD: 3.21 M/UL (ref 4.5–5.9)
RBC # BLD: ABNORMAL 10*6/UL
SEG NEUTROPHILS: 72 % (ref 36–66)
SEGMENTED NEUTROPHILS ABSOLUTE COUNT: 5.83 K/UL (ref 1.3–9.1)
SODIUM BLD-SCNC: 138 MMOL/L (ref 135–144)
TOTAL PROTEIN: 7.3 G/DL (ref 6.4–8.3)
WBC # BLD: 8.1 K/UL (ref 3.5–11)
WBC # BLD: ABNORMAL 10*3/UL

## 2020-04-21 PROCEDURE — 82784 ASSAY IGA/IGD/IGG/IGM EACH: CPT

## 2020-04-21 PROCEDURE — 82248 BILIRUBIN DIRECT: CPT

## 2020-04-21 PROCEDURE — 80053 COMPREHEN METABOLIC PANEL: CPT

## 2020-04-21 PROCEDURE — 36415 COLL VENOUS BLD VENIPUNCTURE: CPT

## 2020-04-21 PROCEDURE — 85610 PROTHROMBIN TIME: CPT

## 2020-04-21 PROCEDURE — 85025 COMPLETE CBC W/AUTO DIFF WBC: CPT

## 2020-05-07 ENCOUNTER — TELEPHONE (OUTPATIENT)
Dept: GASTROENTEROLOGY | Age: 60
End: 2020-05-07

## 2020-05-07 NOTE — TELEPHONE ENCOUNTER
Vicente called asking about his blood work. Writer called and offered him a virtual appointment so Dr Sharon Maldonado can go over the test results.

## 2020-05-12 ENCOUNTER — VIRTUAL VISIT (OUTPATIENT)
Dept: GASTROENTEROLOGY | Age: 60
End: 2020-05-12

## 2020-05-12 PROCEDURE — 99213 OFFICE O/P EST LOW 20 MIN: CPT | Performed by: INTERNAL MEDICINE

## 2020-05-12 ASSESSMENT — ENCOUNTER SYMPTOMS
BLOOD IN STOOL: 0
BACK PAIN: 0
RECTAL PAIN: 0
NAUSEA: 0
CONSTIPATION: 0
RESPIRATORY NEGATIVE: 1
ALLERGIC/IMMUNOLOGIC NEGATIVE: 1
SORE THROAT: 0
SINUS PRESSURE: 0
TROUBLE SWALLOWING: 0
ABDOMINAL PAIN: 0
ANAL BLEEDING: 0
VOICE CHANGE: 0
DIARRHEA: 0
GASTROINTESTINAL NEGATIVE: 1
ABDOMINAL DISTENTION: 0
COUGH: 0
VOMITING: 0
WHEEZING: 0
CHOKING: 0

## 2020-05-14 ENCOUNTER — TELEPHONE (OUTPATIENT)
Dept: GASTROENTEROLOGY | Age: 60
End: 2020-05-14

## 2020-05-14 NOTE — TELEPHONE ENCOUNTER
Contacted Shaq to schedule colonoscopy and follow up visit. Spoke with Jazzy Preez briefly but phone was disconnected.

## 2020-05-18 NOTE — TELEPHONE ENCOUNTER
Yuan Kimball again, began to schedule him for colonoscopy on 6/9/20. Ever Weinstein then stated that he was with a client at work and did not realize that the call would last more than a few minutes. Writer advised Ever Weinstein to call the office back when he is available to speak for about 10-15 minutes. Ever Weinstein expressed understanding, office will await his call to schedule.

## 2022-09-12 ENCOUNTER — HOSPITAL ENCOUNTER (EMERGENCY)
Age: 62
Discharge: HOME | End: 2022-09-12
Attending: EMERGENCY MEDICINE

## 2022-09-12 VITALS
RESPIRATION RATE: 16 BRPM | HEIGHT: 73 IN | BODY MASS INDEX: 24.29 KG/M2 | TEMPERATURE: 98.2 F | DIASTOLIC BLOOD PRESSURE: 91 MMHG | HEART RATE: 105 BPM | OXYGEN SATURATION: 98 % | SYSTOLIC BLOOD PRESSURE: 109 MMHG

## 2022-09-12 DIAGNOSIS — R17 JAUNDICE: Primary | ICD-10-CM

## 2022-09-12 LAB
ABSOLUTE EOS #: 0.15 K/UL (ref 0–0.4)
ABSOLUTE LYMPH #: 0.58 K/UL (ref 1–4.8)
ABSOLUTE MONO #: 0.29 K/UL (ref 0.1–0.8)
ALBUMIN SERPL-MCNC: 2.6 G/DL (ref 3.5–5.2)
ALBUMIN/GLOBULIN RATIO: 0.6 (ref 1–2.5)
ALP BLD-CCNC: 290 U/L (ref 40–129)
ALT SERPL-CCNC: 32 U/L (ref 5–41)
AST SERPL-CCNC: 94 U/L
BASOPHILS # BLD: 0 % (ref 0–2)
BASOPHILS ABSOLUTE: 0 K/UL (ref 0–0.2)
BILIRUB SERPL-MCNC: 19.1 MG/DL (ref 0.3–1.2)
BILIRUBIN DIRECT: 12.6 MG/DL
BILIRUBIN, INDIRECT: 6.5 MG/DL (ref 0–1)
EOSINOPHILS RELATIVE PERCENT: 1 % (ref 1–4)
ETHANOL PERCENT: <0.01 %
ETHANOL: <10 MG/DL
HCT VFR BLD CALC: 32.3 % (ref 41–53)
HEMOGLOBIN: 11.3 G/DL (ref 13.5–17.5)
LYMPHOCYTES # BLD: 4 % (ref 24–44)
MCH RBC QN AUTO: 39.2 PG (ref 26–34)
MCHC RBC AUTO-ENTMCNC: 34.9 G/DL (ref 31–37)
MCV RBC AUTO: 112.2 FL (ref 80–100)
MONOCYTES # BLD: 2 % (ref 1–7)
MORPHOLOGY: ABNORMAL
PDW BLD-RTO: 16.2 % (ref 12.5–15.4)
PLATELET # BLD: 193 K/UL (ref 140–450)
PMV BLD AUTO: 7.6 FL (ref 6–12)
RBC # BLD: 2.88 M/UL (ref 4.5–5.9)
SEG NEUTROPHILS: 93 % (ref 36–66)
SEGMENTED NEUTROPHILS ABSOLUTE COUNT: 13.48 K/UL (ref 1.8–7.7)
TOTAL PROTEIN: 7.2 G/DL (ref 6.4–8.3)
WBC # BLD: 14.5 K/UL (ref 3.5–11)

## 2022-09-12 PROCEDURE — G0480 DRUG TEST DEF 1-7 CLASSES: HCPCS

## 2022-09-12 PROCEDURE — 99283 EMERGENCY DEPT VISIT LOW MDM: CPT

## 2022-09-12 PROCEDURE — 36415 COLL VENOUS BLD VENIPUNCTURE: CPT

## 2022-09-12 PROCEDURE — 80076 HEPATIC FUNCTION PANEL: CPT

## 2022-09-12 PROCEDURE — 85025 COMPLETE CBC W/AUTO DIFF WBC: CPT

## 2022-09-12 NOTE — ED NOTES
Pt ambulate to room- gait steady. Pt reports this episode of jaundice onset 1.5 months PTA.  Pt states he had      Radha Goddard RN  09/12/22 [id number]

## 2022-09-12 NOTE — ED PROVIDER NOTES
[id number] [facility]  [address] [facility] [address] [facility] [address]  Phone: [phone]  Fax: [phone]      Pt Name: Dominik Broderick  [facility]:[id number]  Alpagfsebastian 1960  Date of evaluation: 9/12/2022      CHIEF COMPLAINT       Chief Complaint   Patient presents with    Jaundice     Onset 1.5 months PTA- bloating       HISTORY OF PRESENT ILLNESS   46-year-old male presents to the emergency department today concerned of a 3-week history of jaundice. He tells me that he notices it he was jaundiced at that point stop drinking alcohol. He does have a history of alcohol abuse and addiction. He has been jaundiced in the past related to his alcohol consumption. He denies any pain. He feels like his belly is little distended but denies any problems with bowel or bladder. No shortness of breath. No orthopnea or PND. Has been no other contemporaneous evaluation or management of the symptoms prior to arrival.    REVIEW OF SYSTEMS     Review of Systems   All other systems reviewed and are negative. PAST MEDICAL HISTORY    has no past medical history on file. SURGICAL HISTORY      has a past surgical history that includes Tonsillectomy. CURRENT MEDICATIONS       Previous Medications    CYANOCOBALAMIN (VITAMIN B 12 PO)    Take by mouth       ALLERGIES     is allergic to pcn [penicillins]. FAMILY HISTORY     has no family status information on file. family history is not on file. SOCIAL HISTORY      reports that he does not currently use alcohol. He reports that he does not use drugs. PHYSICAL EXAM     INITIAL VITALS:  height is 6' 1\" (1.854 m). His oral temperature is 98.2 °F (36.8 °C). His blood pressure is 109/91 (abnormal) and his pulse is 105 (abnormal). His respiration is 16 and oxygen saturation is 98%. Physical Exam  Vitals reviewed. Constitutional:       Appearance: He is well-developed. HENT:      Head: Normocephalic and atraumatic.    Eyes:      Conjunctiva/sclera: Conjunctivae normal.      Pupils: Pupils are equal, round, and reactive to light. Neck:      Trachea: No tracheal deviation. Cardiovascular:      Rate and Rhythm: Normal rate and regular rhythm. Pulmonary:      Effort: Pulmonary effort is normal.      Breath sounds: Normal breath sounds. Abdominal:      General: Bowel sounds are normal. There is distension. Palpations: Abdomen is soft. Tenderness: There is no abdominal tenderness. Comments: Abdomen is slightly distended. Musculoskeletal:         General: No tenderness. Normal range of motion. Cervical back: Normal range of motion and neck supple. Skin:     General: Skin is warm and dry. Findings: No rash. Neurological:      Mental Status: He is alert and oriented to person, place, and time. Psychiatric:         Behavior: Behavior normal.         Thought Content: Thought content normal.         Judgment: Judgment normal.         DIFFERENTIAL DIAGNOSIS/ MDM:   I will check baseline blood work on this patient do serial evaluations. DIAGNOSTIC RESULTS     EKG:  None    RADIOLOGY:   No results found.       LABS:  Results for orders placed or performed during the hospital encounter of 09/12/22   CBC with Auto Differential   Result Value Ref Range    WBC 14.5 (H) 3.5 - 11.0 k/uL    RBC 2.88 (L) 4.5 - 5.9 m/uL    Hemoglobin 11.3 (L) 13.5 - 17.5 g/dL    Hematocrit 32.3 (L) 41 - 53 %    .2 (H) 80 - 100 fL    MCH 39.2 (H) 26 - 34 pg    MCHC 34.9 31 - 37 g/dL    RDW 16.2 (H) 12.5 - 15.4 %    Platelets 970 607 - 479 k/uL    MPV 7.6 6.0 - 12.0 fL    Seg Neutrophils 93 (H) 36 - 66 %    Lymphocytes 4 (L) 24 - 44 %    Monocytes 2 1 - 7 %    Eosinophils % 1 1 - 4 %    Basophils 0 0 - 2 %    Segs Absolute 13.48 (H) 1.8 - 7.7 k/uL    Absolute Lymph # 0.58 (L) 1.0 - 4.8 k/uL    Absolute Mono # 0.29 0.1 - 0.8 k/uL    Absolute Eos # 0.15 0.0 - 0.4 k/uL    Basophils Absolute 0.00 0.0 - 0.2 k/uL    Morphology MACROCYTOSIS PRESENT    Hepatic Function Panel   Result Value Ref Range    Albumin 2.6 (L) 3.5 - 5.2 g/dL    Alkaline Phosphatase 290 (H) 40 - 129 U/L    ALT 32 5 - 41 U/L    AST 94 (H) <40 U/L    Total Bilirubin 19.1 (HH) 0.3 - 1.2 mg/dL    Bilirubin, Direct 12.6 (H) <0.31 mg/dL    Bilirubin, Indirect 6.5 (H) 0.00 - 1.00 mg/dL    Total Protein 7.2 6.4 - 8.3 g/dL    Albumin/Globulin Ratio 0.6 (L) 1.0 - 2.5   Ethanol   Result Value Ref Range    Ethanol <10 <10 mg/dL    Ethanol percent <0.010 <0.010 %       EMERGENCY DEPARTMENT COURSE:     Thepatient was given the following medications:  No orders of the defined types were placed in this encounter. Vitals:    Vitals:    09/12/22 1144   BP: (!) 109/91   Pulse: (!) 105   Resp: 16   Temp: 98.2 °F (36.8 °C)   TempSrc: Oral   SpO2: 98%   Height: 6' 1\" (1.854 m)     -------------------------  BP: (!) 109/91, Temp: 98.2 °F (36.8 °C), Heart Rate: (!) 105, Resp: 16      Re-evaluation Notes  Bilirubin is indeed elevated along with other LFTs. I do feel that this is related to his alcohol abuse.   I will refer him on to GI.    CONSULTS:  None    CRITICAL CARE:   None    PROCEDURES:  None    FINAL IMPRESSION      1. Jaundice          DISPOSITION/PLAN   DISPOSITION Decision To Discharge 09/12/2022 01:20:27 PM      Condition on Disposition  Stable    PATIENT REFERRED TO:  Lavelle Messer MD  [address] [facility]  [phone]    Schedule an appointment as soon as possible for a visit in 2 days      DISCHARGE MEDICATIONS:  [unfilled]    (Please note that portions of this note were completed with a voice recognitionprogram.  Efforts were made to edit the dictations but occasionally words are mis-transcribed.)    Lilian Daugherty MD MD, F.A.C.E.P, F.A.A.E.M  Emergency Physician Attending          Lilian Daugherty MD  09/12/22 [id number]

## 2022-09-12 NOTE — ED NOTES
Pt ambulates to room- gait steady. Pt reports Hx of drinking vodka daily. Stopped 1 week PTA. Pt reports onset of jaundice 1.5 months PTA= abd bloating. Pt AOx4. RR easy,unlabored.       Norma Cockayne, RN  09/12/22 6754

## 2023-02-06 ENCOUNTER — OFFICE VISIT (OUTPATIENT)
Dept: GASTROENTEROLOGY | Age: 63
End: 2023-02-06
Payer: COMMERCIAL

## 2023-02-06 VITALS
BODY MASS INDEX: 26.25 KG/M2 | SYSTOLIC BLOOD PRESSURE: 106 MMHG | HEART RATE: 78 BPM | DIASTOLIC BLOOD PRESSURE: 64 MMHG | WEIGHT: 199 LBS

## 2023-02-06 DIAGNOSIS — K70.30 ALCOHOLIC CIRRHOSIS OF LIVER WITHOUT ASCITES (HCC): ICD-10-CM

## 2023-02-06 DIAGNOSIS — K63.5 DYSPLASTIC POLYP OF COLON: Primary | ICD-10-CM

## 2023-02-06 PROCEDURE — 99204 OFFICE O/P NEW MOD 45 MIN: CPT | Performed by: INTERNAL MEDICINE

## 2023-02-06 ASSESSMENT — ENCOUNTER SYMPTOMS
NAUSEA: 0
CONSTIPATION: 0
COUGH: 0
BLOOD IN STOOL: 0
TROUBLE SWALLOWING: 0
ABDOMINAL PAIN: 0
CHOKING: 0
SHORTNESS OF BREATH: 0
VOMITING: 0
ABDOMINAL DISTENTION: 0
WHEEZING: 0
DIARRHEA: 0
ANAL BLEEDING: 0
RECTAL PAIN: 0

## 2023-02-06 NOTE — PROGRESS NOTES
GI CLINIC FOLLOW UP    INTERVAL HISTORY:   Neal Kwan MD  [address],  [address]    Chief Complaint   Patient presents with    Hepatitis     Patient was last seen in 2020 by Dr Abhi Justice for alcoholic hepatitis and jaundice. He was referred back to us by ER for jaundice. Patient is concerned about hepatitis, as he only knew about cirrhosis. He sates he is feeling good now and has been sober from alcohol for 6+ months. HISTORY OF PRESENT ILLNESS: Mr.Matthew James Silver is a 63 y.o. male , referred for evaluation of   malignant polyps ( per pt hx at [facility])  ETOH cirrhosis,     Here for the first time    Saw [facility] 2020 at that time he did have alcoholic hepatitis  Looks like he had multiple admission with that but he quit drinking since his last admission in [facility] at that time he was admitted at [facility] in summer with alcoholic hepatitis  And he quit drinking since then and his liver diseases has improved  Nevertheless to my surprise he and his wife are saying that he was told he had malignant polyp during colonoscopy head there  But he was discharged and he could not follow  Because he did not have any insurance with anybody  For which he just ignored the issue with the malignant polyp  Once again according to him and his wife   Colon : malignant polyp, no insurance   Quit ETOH in 8/2022   Labs at [facility] in 10/2022:much better  The only thing he still having some itching  He is not having any lower extremity edema no abdominal distention  No confusion no bleeding    Do not have any labs or CAT scan or colonoscopy report available from [facility] on this patient we could not access those records          Past Medical,Family, and Social History reviewed and does contribute to the patient presentingcondition. Patient's PMH/PSH,SH,PSYCH Hx, MEDs, ALLERGIES, and ROS were all reviewed and updated in the appropriate sections.     PAST MEDICAL HISTORY:  Past Medical History: Diagnosis Date    Cirrhosis ([facility] [address]        Past Surgical History:   Procedure Laterality Date    TONSILLECTOMY         CURRENT MEDICATIONS:    Current Outpatient Medications:     Cyanocobalamin (VITAMIN B 12 PO), Take by mouth (Patient not taking: Reported on 2/6/2023), Disp: , Rfl:     ALLERGIES:   Allergies   Allergen Reactions    Pcn [Penicillins]      Unknown reaction       FAMILY HISTORY:       Problem Relation Age of Onset    Thyroid Disease Mother     Lymphoma Father          SOCIAL HISTORY:   Social History     Socioeconomic History    Marital status: Single     Spouse name: Not on file    Number of children: Not on file    Years of education: Not on file    Highest education level: Not on file   Occupational History    Not on file   Tobacco Use    Smoking status: Never    Smokeless tobacco: Not on file   Vaping Use    Vaping Use: Never used   Substance and Sexual Activity    Alcohol use: Not Currently     Comment: quit 8/2022    Drug use: Never    Sexual activity: Not on file   Other Topics Concern    Not on file   Social History Narrative    Not on file     Social Determinants of Health     Financial Resource Strain: Not on file   Food Insecurity: Not on file   Transportation Needs: Not on file   Physical Activity: Not on file   Stress: Not on file   Social Connections: Not on file   Intimate Partner Violence: Not on file   Housing Stability: Not on file       REVIEW OF SYSTEMS: A 12-point review of systemswas obtained and pertinent positives and negatives were enumerated above in the history of present illness. All other reviewed systems / symptoms were negative. Review of Systems   Constitutional:  Negative for appetite change, fatigue and unexpected weight change. HENT:  Negative for trouble swallowing. Respiratory:  Negative for cough, choking, shortness of breath and wheezing. Cardiovascular:  Negative for chest pain, palpitations and leg swelling.    Gastrointestinal:  Negative for abdominal distention, abdominal pain, anal bleeding, blood in stool, constipation, diarrhea, nausea, rectal pain and vomiting. Genitourinary:  Negative for difficulty urinating. Allergic/Immunologic: Negative for environmental allergies and food allergies. Neurological:  Negative for dizziness, weakness, light-headedness, numbness and headaches. Hematological:  Does not bruise/bleed easily. Psychiatric/Behavioral:  Negative for sleep disturbance. The patient is not nervous/anxious. LABORATORY DATA: Reviewed  Lab Results   Component Value Date    WBC 14.5 (H) 09/12/2022    HGB 11.3 (L) 09/12/2022    HCT 32.3 (L) 09/12/2022    .2 (H) 09/12/2022     09/12/2022     04/21/2020    K 4.4 04/21/2020     04/21/2020    CO2 20 04/21/2020    BUN 14 04/21/2020    CREATININE 0.72 04/21/2020    LABALBU 2.6 (L) 09/12/2022    BILITOT 19.1 (HH) 09/12/2022    ALKPHOS 290 (H) 09/12/2022    AST 94 (H) 09/12/2022    ALT 32 09/12/2022    INR 0.9 04/21/2020         Lab Results   Component Value Date    RBC 2.88 (L) 09/12/2022    HGB 11.3 (L) 09/12/2022    .2 (H) 09/12/2022    MCH 39.2 (H) 09/12/2022    MCHC 34.9 09/12/2022    RDW 16.2 (H) 09/12/2022    MPV 7.6 09/12/2022    BASOPCT 0 09/12/2022    LYMPHSABS 0.58 (L) 09/12/2022    MONOSABS 0.29 09/12/2022    NEUTROABS 13.48 (H) 09/12/2022    EOSABS 0.15 09/12/2022    BASOSABS 0.00 09/12/2022         DIAGNOSTIC TESTING:     No results found. PHYSICAL EXAMINATION: Vital signs reviewed per the nursing documentation. /64   Pulse 78   Wt 199 lb (90.3 kg)   BMI 26.25 kg/m²   Body mass index is 26.25 kg/m². Physical Exam  Vitals and nursing note reviewed. Constitutional:       General: He is not in acute distress. Appearance: He is well-developed. He is not diaphoretic. HENT:      Head: Normocephalic and atraumatic. Eyes:      General: No scleral icterus. Pupils: Pupils are equal, round, and reactive to light. Neck:      Thyroid: No thyromegaly. Vascular: No JVD. Trachea: No tracheal deviation. Cardiovascular:      Rate and Rhythm: Normal rate and regular rhythm. Heart sounds: Normal heart sounds. No murmur heard. Pulmonary:      Effort: Pulmonary effort is normal. No respiratory distress. Breath sounds: Normal breath sounds. No wheezing. Abdominal:      General: Bowel sounds are normal. There is no distension. Palpations: Abdomen is soft. There is no mass. Tenderness: There is no abdominal tenderness. There is no guarding or rebound. Musculoskeletal:         General: No tenderness. Normal range of motion. Cervical back: Normal range of motion and neck supple. Skin:     General: Skin is warm. Coloration: Skin is not pale. Findings: No erythema or rash. Comments: He is not diaphoretic   Neurological:      Mental Status: He is alert and oriented to person, place, and time. Deep Tendon Reflexes: Reflexes are normal and symmetric. Psychiatric:         Behavior: Behavior normal.         Thought Content: Thought content normal.         Judgment: Judgment normal.         IMPRESSION: Mr. Cole Beasley is a 63 y.o. male with      Diagnosis Orders   1. Dysplastic polyp of colon  MRI ABDOMEN W WO CONTRAST MRCP    COLONOSCOPY W/ OR W/O BIOPSY      2. Alcoholic cirrhosis of liver without ascites (HCC)  AFP Tumor Marker    Iron and TIBC    Protime-INR    PRESTON    Smooth Muscle Antibody Quant    MITOCHONDRIAL ANTIBODIES, M2, IGG    MRI ABDOMEN W WO CONTRAST MRCP    CBC with Auto Differential    Hepatic Function Panel        Looks like this patient had alcoholic liver disease which has improved after being sober since the summer  Nevertheless we will reevaluate and see where he is at this point to rule out other causes of liver disease.   Viral hepatitis panel has been negative in the past.    The most significant issue at this time that he was told he had a malignant polyp in his colon but it was not removed and he was supposed to follow-up outpatient  But he could not because he did not have insurance for which she did not follow-up with anybody    We will proceed with a colonoscopy and evaluate and take it from there  We need his records from [facility]      Diet/life style/natural hx /complication of the dx were all explained in details   Past medical, past surgical, social history, psychiatric history, medications or allergies, all reviewed and  updated    Thank you for allowing me to participate in the care of Mr. Jane Church. For any further questions please do not hesitate to contact me. I have reviewed and agree with the ROS entered by the MA/RN. Note is dictated utilizing voice recognition software. Unfortunately this leads to occasional typographical errors. Please contact our office if you have any questions.       Yelena Gonzalez MD  [facility]  O: #[phone]

## 2023-02-08 ENCOUNTER — HOSPITAL ENCOUNTER (OUTPATIENT)
Dept: PREADMISSION TESTING | Age: 63
Discharge: HOME | End: 2023-02-12

## 2023-02-08 VITALS — BODY MASS INDEX: 26.37 KG/M2 | WEIGHT: 199 LBS | HEIGHT: 73 IN

## 2023-02-08 NOTE — PROGRESS NOTES
Pre-op Instructions For Out-Patient Endoscopy Surgery    Medication Instructions:  Please stop herbs and any supplements now (includes vitamins and minerals). Please contact your surgeon and prescribing physician for pre-op instructions for any blood thinners. If you have inhalers/aerosol treatments at home, please use them the morning of your surgery and bring the inhalers with you to the hospital.    Please take the following medications the morning of your surgery with a sip of water:    None    Surgery Instructions:  After midnight before surgery:  Do not eat or drink anything, including water, mints, gum, and hard candy. You may brush your teeth without swallowing. No smoking, chewing tobacco, or street drugs. Please shower or bathe before surgery. Please do not wear any cologne, lotion, powder, jewelry, piercings, perfume, makeup, nail polish, hair accessories, or hair spray on the day of surgery. Wear loose comfortable clothing. Leave your valuables at home. Bring a storage case for any glasses/contacts. An adult who is responsible for you MUST drive you home and should be with you for the first 24 hours after surgery. The Day of Surgery:  Arrive at [facility] AT [facility] Surgery Entrance at the time directed by your surgeon and check in at the desk. If you have a living will or healthcare power of , please bring a copy. You will be taken to the pre-op holding area where you will be prepared for surgery. A physical assessment will be performed by a nurse practitioner or house officer. Your IV will be started and you will meet your anesthesiologist.    When you go to surgery, your family will be directed to the surgical waiting room, where the doctor should speak with them after your surgery. After surgery, you will be taken to the recovery room then when you are awake and stable you will go to the short stay unit for preparation to be discharged. Instructions read to Jose Paredes and understanding verbalized.      Dr. Cee Mccarty has lab work ordered and Jose Paredes states he will be getting that drawn today 2/8/23 2/22/23 Colonoscopy

## 2023-02-09 LAB
A/G RATIO: NORMAL
ALBUMIN SERPL-MCNC: NORMAL G/DL
ALP BLD-CCNC: NORMAL U/L
ALT SERPL-CCNC: NORMAL U/L
ANA TITER: NORMAL
AST SERPL-CCNC: NORMAL U/L
BASOPHILS ABSOLUTE: NORMAL
BASOPHILS RELATIVE PERCENT: NORMAL
BILIRUB SERPL-MCNC: NORMAL MG/DL
BILIRUBIN DIRECT: NORMAL
BILIRUBIN, INDIRECT: NORMAL
EOSINOPHILS ABSOLUTE: NORMAL
EOSINOPHILS RELATIVE PERCENT: NORMAL
GLOBULIN: NORMAL
HCT VFR BLD CALC: NORMAL %
HEMOGLOBIN: NORMAL
INR BLD: NORMAL
IRON: NORMAL
LYMPHOCYTES ABSOLUTE: NORMAL
LYMPHOCYTES RELATIVE PERCENT: NORMAL
MCH RBC QN AUTO: NORMAL PG
MCHC RBC AUTO-ENTMCNC: NORMAL G/DL
MCV RBC AUTO: NORMAL FL
MONOCYTES ABSOLUTE: NORMAL
MONOCYTES RELATIVE PERCENT: NORMAL
NEUTROPHILS ABSOLUTE: NORMAL
NEUTROPHILS RELATIVE PERCENT: NORMAL
PDW BLD-RTO: NORMAL %
PLATELET # BLD: NORMAL 10*3/UL
PMV BLD AUTO: NORMAL FL
PROTEIN TOTAL: NORMAL
PROTIME: NORMAL
RBC # BLD: NORMAL 10*6/UL
TOTAL IRON BINDING CAPACITY: NORMAL
WBC # BLD: NORMAL 10*3/UL

## 2023-02-10 RX ORDER — SODIUM, POTASSIUM,MAG SULFATES 17.5-3.13G
SOLUTION, RECONSTITUTED, ORAL ORAL
Qty: 1 EACH | Refills: 0 | Status: SHIPPED | OUTPATIENT
Start: 2023-02-10

## 2023-02-10 RX ORDER — BISACODYL 5 MG
TABLET, DELAYED RELEASE (ENTERIC COATED) ORAL
Qty: 4 TABLET | Refills: 0 | Status: SHIPPED | OUTPATIENT
Start: 2023-02-10

## 2023-02-13 ENCOUNTER — HOSPITAL ENCOUNTER (OUTPATIENT)
Age: 63
Setting detail: SPECIMEN
Discharge: HOME | End: 2023-02-13

## 2023-02-13 ENCOUNTER — HOSPITAL ENCOUNTER (OUTPATIENT)
Dept: MRI IMAGING | Facility: CLINIC | Age: 63
Discharge: HOME | End: 2023-02-15
Payer: COMMERCIAL

## 2023-02-13 DIAGNOSIS — K70.30 ALCOHOLIC CIRRHOSIS OF LIVER WITHOUT ASCITES (HCC): ICD-10-CM

## 2023-02-13 DIAGNOSIS — K63.5 DYSPLASTIC POLYP OF COLON: ICD-10-CM

## 2023-02-13 LAB — POC CREATININE: 1.4 MG/DL (ref 0.6–1.4)

## 2023-02-13 PROCEDURE — 74183 MRI ABD W/O CNTR FLWD CNTR: CPT

## 2023-02-13 PROCEDURE — 6360000004 HC RX CONTRAST MEDICATION: Performed by: INTERNAL MEDICINE

## 2023-02-13 PROCEDURE — A9579 GAD-BASE MR CONTRAST NOS,1ML: HCPCS | Performed by: INTERNAL MEDICINE

## 2023-02-13 PROCEDURE — 2580000003 HC RX 258: Performed by: INTERNAL MEDICINE

## 2023-02-13 RX ORDER — SODIUM CHLORIDE 0.9 % (FLUSH) 0.9 %
10 SYRINGE (ML) INJECTION PRN
Status: COMPLETED | OUTPATIENT
Start: 2023-02-13 | End: 2023-02-13

## 2023-02-13 RX ADMIN — SODIUM CHLORIDE, PRESERVATIVE FREE 10 ML: 5 INJECTION INTRAVENOUS at 08:39

## 2023-02-13 RX ADMIN — GADOTERIDOL 20 ML: 279.3 INJECTION, SOLUTION INTRAVENOUS at 08:39

## 2023-02-20 NOTE — PRE-PROCEDURE INSTRUCTIONS
No answer, left message ?  -YES                           Unable to leave message ?    When were you told to arrive at hospital ?      Do you have a  ?    Are you on any blood thinners ?                     If yes when did you stop taking ?    Do you have your prep Rx filled and instruction ?      Nothing to eat the day before , only clear liquids.    Are you experiencing any covid symptoms ?     Do you have any infections or rash we should be aware of ?      Do you have the Hibiclens soap to use the night before and the morning of surgery ?    Nothing to eat or drink after midnight, only a sip of water to take any medication instructed to take the night before.  Wear comfortable clothing, leave any valuables at home, remove any jewelry and body piercing .

## 2023-02-21 ENCOUNTER — ANESTHESIA EVENT (OUTPATIENT)
Dept: ENDOSCOPY | Age: 63
End: 2023-02-21
Payer: COMMERCIAL

## 2023-02-22 ENCOUNTER — HOSPITAL ENCOUNTER (INPATIENT)
Age: 63
LOS: 1 days | Discharge: HOME | DRG: 951 | End: 2023-02-23
Attending: INTERNAL MEDICINE | Admitting: INTERNAL MEDICINE
Payer: COMMERCIAL

## 2023-02-22 ENCOUNTER — ANESTHESIA (OUTPATIENT)
Dept: ENDOSCOPY | Age: 63
End: 2023-02-22
Payer: COMMERCIAL

## 2023-02-22 DIAGNOSIS — K63.5 DYSPLASTIC POLYP OF COLON: ICD-10-CM

## 2023-02-22 PROBLEM — Z98.890 S/P COLONOSCOPIC POLYPECTOMY: Status: ACTIVE | Noted: 2023-02-22

## 2023-02-22 LAB
ABSOLUTE EOS #: 0.1 K/UL (ref 0–0.4)
ABSOLUTE LYMPH #: 0.71 K/UL (ref 1–4.8)
ABSOLUTE MONO #: 0.31 K/UL (ref 0.1–1.3)
ALBUMIN SERPL-MCNC: 3.7 G/DL (ref 3.5–5.2)
ALP SERPL-CCNC: 69 U/L (ref 40–129)
ALT SERPL-CCNC: 9 U/L (ref 5–41)
ANION GAP SERPL CALCULATED.3IONS-SCNC: 10 MMOL/L (ref 9–17)
AST SERPL-CCNC: 18 U/L
BASOPHILS # BLD: 2 % (ref 0–2)
BASOPHILS ABSOLUTE: 0.07 K/UL (ref 0–0.2)
BILIRUB SERPL-MCNC: 0.8 MG/DL (ref 0.3–1.2)
BUN SERPL-MCNC: 14 MG/DL (ref 8–23)
CALCIUM SERPL-MCNC: 8.4 MG/DL (ref 8.6–10.4)
CHLORIDE SERPL-SCNC: 108 MMOL/L (ref 98–107)
CO2 SERPL-SCNC: 22 MMOL/L (ref 20–31)
CREAT SERPL-MCNC: 1 MG/DL (ref 0.7–1.2)
EOSINOPHILS RELATIVE PERCENT: 3 % (ref 0–4)
GFR SERPL CREATININE-BSD FRML MDRD: >60 ML/MIN/1.73M2
GLUCOSE SERPL-MCNC: 125 MG/DL (ref 70–99)
HCT VFR BLD AUTO: 26.6 % (ref 41–53)
HCT VFR BLD AUTO: 27.4 % (ref 41–53)
HCT VFR BLD AUTO: 28.4 % (ref 41–53)
HGB BLD-MCNC: 8.4 G/DL (ref 13.5–17.5)
HGB BLD-MCNC: 8.6 G/DL (ref 13.5–17.5)
HGB BLD-MCNC: 8.8 G/DL (ref 13.5–17.5)
INR PPP: 1.4
LYMPHOCYTES # BLD: 21 % (ref 24–44)
MAGNESIUM SERPL-MCNC: 1.7 MG/DL (ref 1.6–2.6)
MCH RBC QN AUTO: 24.5 PG (ref 26–34)
MCHC RBC AUTO-ENTMCNC: 32.3 G/DL (ref 31–37)
MCV RBC AUTO: 75.7 FL (ref 80–100)
MONOCYTES # BLD: 9 % (ref 1–7)
MORPHOLOGY: ABNORMAL
PARTIAL THROMBOPLASTIN TIME: 35.6 SEC (ref 24–36)
PDW BLD-RTO: 20 % (ref 11.5–14.9)
PLATELET # BLD AUTO: 123 K/UL (ref 150–450)
PMV BLD AUTO: 8 FL (ref 6–12)
POTASSIUM SERPL-SCNC: 3.3 MMOL/L (ref 3.7–5.3)
PROT SERPL-MCNC: 6.8 G/DL (ref 6.4–8.3)
PROTHROMBIN TIME: 17.3 SEC (ref 11.8–14.6)
RBC # BLD: 3.52 M/UL (ref 4.5–5.9)
SEG NEUTROPHILS: 65 % (ref 36–66)
SEGMENTED NEUTROPHILS ABSOLUTE COUNT: 2.21 K/UL (ref 1.3–9.1)
SODIUM SERPL-SCNC: 140 MMOL/L (ref 135–144)
WBC # BLD AUTO: 3.4 K/UL (ref 3.5–11)

## 2023-02-22 PROCEDURE — 3700000000 HC ANESTHESIA ATTENDED CARE: Performed by: INTERNAL MEDICINE

## 2023-02-22 PROCEDURE — 85730 THROMBOPLASTIN TIME PARTIAL: CPT

## 2023-02-22 PROCEDURE — C1889 IMPLANT/INSERT DEVICE, NOC: HCPCS | Performed by: INTERNAL MEDICINE

## 2023-02-22 PROCEDURE — 85610 PROTHROMBIN TIME: CPT

## 2023-02-22 PROCEDURE — 85018 HEMOGLOBIN: CPT

## 2023-02-22 PROCEDURE — 45385 COLONOSCOPY W/LESION REMOVAL: CPT | Performed by: INTERNAL MEDICINE

## 2023-02-22 PROCEDURE — 2500000003 HC RX 250 WO HCPCS: Performed by: NURSE ANESTHETIST, CERTIFIED REGISTERED

## 2023-02-22 PROCEDURE — 3609010600 HC COLONOSCOPY POLYPECTOMY SNARE/COLD BIOPSY: Performed by: INTERNAL MEDICINE

## 2023-02-22 PROCEDURE — 3E0H8GC INTRODUCTION OF OTHER THERAPEUTIC SUBSTANCE INTO LOWER GI, VIA NATURAL OR ARTIFICIAL OPENING ENDOSCOPIC: ICD-10-PCS | Performed by: INTERNAL MEDICINE

## 2023-02-22 PROCEDURE — 83735 ASSAY OF MAGNESIUM: CPT

## 2023-02-22 PROCEDURE — 2709999900 HC NON-CHARGEABLE SUPPLY: Performed by: INTERNAL MEDICINE

## 2023-02-22 PROCEDURE — 6360000002 HC RX W HCPCS: Performed by: NURSE ANESTHETIST, CERTIFIED REGISTERED

## 2023-02-22 PROCEDURE — 80053 COMPREHEN METABOLIC PANEL: CPT

## 2023-02-22 PROCEDURE — 7100000000 HC PACU RECOVERY - FIRST 15 MIN: Performed by: INTERNAL MEDICINE

## 2023-02-22 PROCEDURE — 2580000003 HC RX 258: Performed by: ANESTHESIOLOGY

## 2023-02-22 PROCEDURE — G0378 HOSPITAL OBSERVATION PER HR: HCPCS

## 2023-02-22 PROCEDURE — 86850 RBC ANTIBODY SCREEN: CPT

## 2023-02-22 PROCEDURE — 0DBL8ZZ EXCISION OF TRANSVERSE COLON, VIA NATURAL OR ARTIFICIAL OPENING ENDOSCOPIC: ICD-10-PCS | Performed by: INTERNAL MEDICINE

## 2023-02-22 PROCEDURE — 86900 BLOOD TYPING SEROLOGIC ABO: CPT

## 2023-02-22 PROCEDURE — 2060000000 HC ICU INTERMEDIATE R&B

## 2023-02-22 PROCEDURE — 7100000001 HC PACU RECOVERY - ADDTL 15 MIN: Performed by: INTERNAL MEDICINE

## 2023-02-22 PROCEDURE — 88342 IMHCHEM/IMCYTCHM 1ST ANTB: CPT

## 2023-02-22 PROCEDURE — 88341 IMHCHEM/IMCYTCHM EA ADD ANTB: CPT

## 2023-02-22 PROCEDURE — 2580000003 HC RX 258: Performed by: INTERNAL MEDICINE

## 2023-02-22 PROCEDURE — 86920 COMPATIBILITY TEST SPIN: CPT

## 2023-02-22 PROCEDURE — 88305 TISSUE EXAM BY PATHOLOGIST: CPT

## 2023-02-22 PROCEDURE — 6360000002 HC RX W HCPCS: Performed by: INTERNAL MEDICINE

## 2023-02-22 PROCEDURE — 36415 COLL VENOUS BLD VENIPUNCTURE: CPT

## 2023-02-22 PROCEDURE — 85014 HEMATOCRIT: CPT

## 2023-02-22 PROCEDURE — 36430 TRANSFUSION BLD/BLD COMPNT: CPT

## 2023-02-22 PROCEDURE — 3700000001 HC ADD 15 MINUTES (ANESTHESIA): Performed by: INTERNAL MEDICINE

## 2023-02-22 PROCEDURE — 99223 1ST HOSP IP/OBS HIGH 75: CPT | Performed by: INTERNAL MEDICINE

## 2023-02-22 PROCEDURE — 0DBH8ZZ EXCISION OF CECUM, VIA NATURAL OR ARTIFICIAL OPENING ENDOSCOPIC: ICD-10-PCS | Performed by: INTERNAL MEDICINE

## 2023-02-22 PROCEDURE — 85025 COMPLETE CBC W/AUTO DIFF WBC: CPT

## 2023-02-22 PROCEDURE — 86901 BLOOD TYPING SEROLOGIC RH(D): CPT

## 2023-02-22 PROCEDURE — P9016 RBC LEUKOCYTES REDUCED: HCPCS

## 2023-02-22 PROCEDURE — 0W3P8ZZ CONTROL BLEEDING IN GASTROINTESTINAL TRACT, VIA NATURAL OR ARTIFICIAL OPENING ENDOSCOPIC: ICD-10-PCS | Performed by: INTERNAL MEDICINE

## 2023-02-22 RX ORDER — PROPOFOL 10 MG/ML
INJECTION, EMULSION INTRAVENOUS PRN
Status: DISCONTINUED | OUTPATIENT
Start: 2023-02-22 | End: 2023-02-22 | Stop reason: SDUPTHER

## 2023-02-22 RX ORDER — SODIUM CHLORIDE 9 MG/ML
INJECTION, SOLUTION INTRAVENOUS CONTINUOUS
Status: DISCONTINUED | OUTPATIENT
Start: 2023-02-22 | End: 2023-02-23 | Stop reason: HOSPADM

## 2023-02-22 RX ORDER — SODIUM CHLORIDE 9 MG/ML
INJECTION, SOLUTION INTRAVENOUS PRN
Status: DISCONTINUED | OUTPATIENT
Start: 2023-02-22 | End: 2023-02-22

## 2023-02-22 RX ORDER — SODIUM CHLORIDE 9 MG/ML
INJECTION, SOLUTION INTRAVENOUS CONTINUOUS
Status: DISCONTINUED | OUTPATIENT
Start: 2023-02-22 | End: 2023-02-22

## 2023-02-22 RX ORDER — SODIUM CHLORIDE 9 MG/ML
INJECTION, SOLUTION INTRAVENOUS PRN
Status: DISCONTINUED | OUTPATIENT
Start: 2023-02-22 | End: 2023-02-22 | Stop reason: HOSPADM

## 2023-02-22 RX ORDER — SODIUM CHLORIDE, SODIUM LACTATE, POTASSIUM CHLORIDE, CALCIUM CHLORIDE 600; 310; 30; 20 MG/100ML; MG/100ML; MG/100ML; MG/100ML
INJECTION, SOLUTION INTRAVENOUS CONTINUOUS
Status: DISCONTINUED | OUTPATIENT
Start: 2023-02-22 | End: 2023-02-22 | Stop reason: HOSPADM

## 2023-02-22 RX ORDER — SODIUM CHLORIDE 0.9 % (FLUSH) 0.9 %
5-40 SYRINGE (ML) INJECTION EVERY 12 HOURS SCHEDULED
Status: DISCONTINUED | OUTPATIENT
Start: 2023-02-22 | End: 2023-02-22 | Stop reason: HOSPADM

## 2023-02-22 RX ORDER — EPINEPHRINE 1 MG/ML(1)
AMPUL (ML) INJECTION PRN
Status: DISCONTINUED | OUTPATIENT
Start: 2023-02-22 | End: 2023-02-22 | Stop reason: ALTCHOICE

## 2023-02-22 RX ORDER — LIDOCAINE HYDROCHLORIDE 10 MG/ML
1 INJECTION, SOLUTION EPIDURAL; INFILTRATION; INTRACAUDAL; PERINEURAL
Status: DISCONTINUED | OUTPATIENT
Start: 2023-02-22 | End: 2023-02-22 | Stop reason: HOSPADM

## 2023-02-22 RX ORDER — EPHEDRINE SULFATE/0.9% NACL/PF 50 MG/5 ML
SYRINGE (ML) INTRAVENOUS PRN
Status: DISCONTINUED | OUTPATIENT
Start: 2023-02-22 | End: 2023-02-22 | Stop reason: SDUPTHER

## 2023-02-22 RX ORDER — LIDOCAINE HYDROCHLORIDE 20 MG/ML
INJECTION, SOLUTION EPIDURAL; INFILTRATION; INTRACAUDAL; PERINEURAL PRN
Status: DISCONTINUED | OUTPATIENT
Start: 2023-02-22 | End: 2023-02-22 | Stop reason: SDUPTHER

## 2023-02-22 RX ORDER — PROPOFOL 10 MG/ML
INJECTION, EMULSION INTRAVENOUS CONTINUOUS PRN
Status: DISCONTINUED | OUTPATIENT
Start: 2023-02-22 | End: 2023-02-22 | Stop reason: SDUPTHER

## 2023-02-22 RX ORDER — MIDAZOLAM HYDROCHLORIDE 1 MG/ML
INJECTION INTRAMUSCULAR; INTRAVENOUS PRN
Status: DISCONTINUED | OUTPATIENT
Start: 2023-02-22 | End: 2023-02-22 | Stop reason: SDUPTHER

## 2023-02-22 RX ORDER — SODIUM CHLORIDE 0.9 % (FLUSH) 0.9 %
5-40 SYRINGE (ML) INJECTION PRN
Status: DISCONTINUED | OUTPATIENT
Start: 2023-02-22 | End: 2023-02-22 | Stop reason: HOSPADM

## 2023-02-22 RX ORDER — FENTANYL CITRATE 50 UG/ML
INJECTION, SOLUTION INTRAMUSCULAR; INTRAVENOUS PRN
Status: DISCONTINUED | OUTPATIENT
Start: 2023-02-22 | End: 2023-02-22 | Stop reason: SDUPTHER

## 2023-02-22 RX ADMIN — FENTANYL CITRATE 25 MCG: 50 INJECTION, SOLUTION INTRAMUSCULAR; INTRAVENOUS at 12:00

## 2023-02-22 RX ADMIN — SODIUM CHLORIDE: 9 INJECTION, SOLUTION INTRAVENOUS at 17:36

## 2023-02-22 RX ADMIN — SODIUM CHLORIDE, POTASSIUM CHLORIDE, SODIUM LACTATE AND CALCIUM CHLORIDE: 600; 310; 30; 20 INJECTION, SOLUTION INTRAVENOUS at 10:15

## 2023-02-22 RX ADMIN — LIDOCAINE HYDROCHLORIDE 90 MG: 20 INJECTION, SOLUTION EPIDURAL; INFILTRATION; INTRACAUDAL; PERINEURAL at 11:55

## 2023-02-22 RX ADMIN — FENTANYL CITRATE 50 MCG: 50 INJECTION, SOLUTION INTRAMUSCULAR; INTRAVENOUS at 11:55

## 2023-02-22 RX ADMIN — Medication 10 MG: at 11:32

## 2023-02-22 RX ADMIN — FENTANYL CITRATE 50 MCG: 50 INJECTION, SOLUTION INTRAMUSCULAR; INTRAVENOUS at 13:06

## 2023-02-22 RX ADMIN — Medication 20 MG: at 12:05

## 2023-02-22 RX ADMIN — PROPOFOL 1050 MG: 10 INJECTION, EMULSION INTRAVENOUS at 11:55

## 2023-02-22 RX ADMIN — SODIUM CHLORIDE, POTASSIUM CHLORIDE, SODIUM LACTATE AND CALCIUM CHLORIDE: 600; 310; 30; 20 INJECTION, SOLUTION INTRAVENOUS at 13:49

## 2023-02-22 RX ADMIN — MIDAZOLAM 2 MG: 1 INJECTION INTRAMUSCULAR; INTRAVENOUS at 11:54

## 2023-02-22 RX ADMIN — FENTANYL CITRATE 25 MCG: 50 INJECTION, SOLUTION INTRAMUSCULAR; INTRAVENOUS at 12:05

## 2023-02-22 RX ADMIN — FENTANYL CITRATE 50 MCG: 50 INJECTION, SOLUTION INTRAMUSCULAR; INTRAVENOUS at 13:23

## 2023-02-22 RX ADMIN — Medication 5 MG: at 12:18

## 2023-02-22 RX ADMIN — PROPOFOL 50 MCG/KG/MIN: 10 INJECTION, EMULSION INTRAVENOUS at 13:35

## 2023-02-22 ASSESSMENT — ENCOUNTER SYMPTOMS
RESPIRATORY NEGATIVE: 1
GASTROINTESTINAL NEGATIVE: 1
EYES NEGATIVE: 1

## 2023-02-22 ASSESSMENT — PAIN - FUNCTIONAL ASSESSMENT: PAIN_FUNCTIONAL_ASSESSMENT: NONE - DENIES PAIN

## 2023-02-22 NOTE — H&P
HISTORY and Carlyn Holder [id number]       NAME:  Angel Flowers  MRN: [id number]   YOB: 1960   Date: 2/22/2023   Age: 63 y.o. Gender: male       COMPLAINT AND PRESENT HISTORY:     Angel Flowers is 63 y.o.,  male, presents for COLONOSCOPY DIAGNOSTIC   Primary dx: Dysplastic polyp of colon [K63.5]. HPI:  SEE PORTION OF THE NOTE BELOW PER DR GARSIA FROM OFFICE VISIT ON 2/6/2023  HISTORY OF PRESENT ILLNESS: Mr.Matthew Kain Dick is a 63 y.o. male , referred for evaluation of   malignant polyps ( per pt hx at [facility])  ETOH cirrhosis,      Here for the first time    Saw Cayden Tilley at that time he did have alcoholic hepatitis  Looks like he had multiple admission with that but he quit drinking since his last admission in [facility] at that time he was admitted at [facility] in summer with alcoholic hepatitis  And he quit drinking since then and his liver diseases has improved  Nevertheless to my surprise he and his wife are saying that he was told he had malignant polyp during colonoscopy head there  But he was discharged and he could not follow  Because he did not have any insurance with anybody  For which he just ignored the issue with the malignant polyp  Once again according to him and his wife   Colon : malignant polyp, no insurance   Quit ETOH in 8/2022   Labs at [address] [place] [address] in 10/2022:much better  The only thing he still having some itching  He is not having any lower extremity edema no abdominal distention  No confusion no bleeding  Do not have any labs or CAT scan or colonoscopy report available from [facility] on this patient we could not access those records      UPDATE HPI:  Angel Flowers is 63 y.o.,   male, having a Diagnostic Colonoscopy. Prior Colonoscopy was done 8/22  at [facility]. Patient has hx of Colon Polyps, no hx hx of Diverticulosis. Patient denies any  FH of Colon Cancer. Patient reports no changes in bowel habits.  No constipation or diarrhea, No GI /Rectal bleeding, experiencing red/ black/ BRBPR stools. Patient has on  history of abd. pain , no nausea or vomiting , no abdominal bloating, no weight loss. Patient denies any Dysphagia. Pt has  no hx of GERD. Pt denies fever/chills, chest pain or SOB. Review of additional significant medical hx:  (See chart for additional detail, including current medications /see ROS for current S/S):       NPO status: pt NPO since the past midnight   Medications taken TODAY (with sip of water): none  Anticoagulation status: none  Prep fully completed: YES.pt reports his last BM is clear liquid . Denies personal hx of blood clots. Denies personal hx of MRSA infection. Denies any personal or family hx of previous complications w/anesthesia. PAST MEDICAL HISTORY     Past Medical History:   Diagnosis Date    Cirrhosis ([address]     History of blood transfusion        SURGICAL HISTORY       Past Surgical History:   Procedure Laterality Date    COLONOSCOPY      ENDOSCOPY, COLON, DIAGNOSTIC      TONSILLECTOMY         FAMILY HISTORY       Family History   Problem Relation Age of Onset    Thyroid Disease Mother     Lymphoma Father        SOCIAL HISTORY       Social History     Socioeconomic History    Marital status: Single   Tobacco Use    Smoking status: Never    Smokeless tobacco: Former   Vaping Use    Vaping Use: Never used   Substance and Sexual Activity    Alcohol use: Not Currently     Comment: quit 8/2022    Drug use: Never           REVIEW OF SYSTEMS      Allergies   Allergen Reactions    Pcn [Penicillins]      Unknown reaction       No current facility-administered medications on file prior to encounter.      Current Outpatient Medications on File Prior to Encounter   Medication Sig Dispense Refill    sodium-potassium-mag sulfate (SUPREP BOWEL PREP KIT) 17.5-3.13-1.6 GM/177ML SOLN solution Please follow instructions given to you by your provider 1 each 0    bisacodyl 5 MG EC tablet Please follow instructions given to you by your provider 4 tablet 0    Cyanocobalamin (VITAMIN B 12 PO) Take by mouth (Patient not taking: No sig reported)         Review of Systems   Constitutional: Negative. HENT: Negative. Eyes: Negative. Respiratory: Negative. Cardiovascular: Negative. Gastrointestinal: Negative. Genitourinary: Negative. Musculoskeletal: Negative. Skin: Negative. Neurological: Negative. Hematological: Negative. Psychiatric/Behavioral: Negative. GENERAL PHYSICAL EXAM     Vitals: See Nursing flow sheet for vital signs     GENERAL APPEARANCE:   Sera Asa is 63 y.o.,  male, not obese, nourished, conscious, alert. Does not appear to be distress or pain at this time. Physical Exam  Constitutional:       General: He is not in acute distress. Appearance: Normal appearance. He is normal weight. He is not ill-appearing. HENT:      Head: Normocephalic. Right Ear: External ear normal.      Left Ear: External ear normal.      Nose: Nose normal.      Mouth/Throat:      Mouth: Mucous membranes are moist.   Eyes:      General:         Right eye: No discharge. Left eye: No discharge. Cardiovascular:      Rate and Rhythm: Normal rate and regular rhythm. Pulses: Normal pulses. Radial pulses are 2+ on the right side and 2+ on the left side. Dorsalis pedis pulses are 2+ on the right side and 2+ on the left side. Posterior tibial pulses are 2+ on the right side and 2+ on the left side. Heart sounds: Normal heart sounds. No murmur heard. No gallop. Pulmonary:      Effort: Pulmonary effort is normal.      Breath sounds: Normal breath sounds. No wheezing or rales. Abdominal:      General: Bowel sounds are normal. There is no distension. Palpations: Abdomen is soft. Tenderness: There is no abdominal tenderness. Musculoskeletal:         General: No swelling or tenderness.  Normal range of motion. Cervical back: Normal range of motion and neck supple. Right lower leg: No edema. Left lower leg: No edema. Skin:     General: Skin is warm and dry. Findings: No bruising or lesion. Neurological:      General: No focal deficit present. Mental Status: He is alert and oriented to person, place, and time. Motor: No weakness.       Gait: Gait normal.   Psychiatric:         Mood and Affect: Mood normal.         Behavior: Behavior normal.                 PROVISIONAL DIAGNOSES / SURGERY:      Dysplastic polyp of colon [K63.5]    COLONOSCOPY DIAGNOSTIC     Patient Active Problem List    Diagnosis Date Noted    Severe malnutrition ([address] 03/27/2020    Hyperbilirubinemia 33/42/3772    Alcoholic hepatitis without ascites 03/26/2020    Unintentional weight loss 03/26/2020    Hypotension due to hypovolemia 03/26/2020    Anemia 03/26/2020    Hyponatremia 03/26/2020    Hypokalemia 03/26/2020    Alcohol abuse 03/26/2020           GRACE Zaragoza - CNP on 2/22/2023 at 9:24 AM

## 2023-02-22 NOTE — OP NOTE
PROCEDURE NOTE    DATE OF PROCEDURE: 2/22/2023    SURGEON: Clint Jaramillo MD  Facility : [facility]  ASSISTANT: None  Anesthesia: mac   PREOPERATIVE DIAGNOSIS:   Patient had malignant polyp diagnosed by a different gastroenterologist at [facility], it was not removed? This is to remove it. Anemia. Liver disease    POSTOPERATIVE DIAGNOSIS: as described below    OPERATION: Total colonoscopy     ANESTHESIA: Moderate Sedation    ESTIMATED BLOOD LOSS: less than 50     COMPLICATIONS: None. SPECIMENS:  Was Obtained:   As below        HISTORY: The patient is a 63y.o. year old male with history of above preop diagnosis. I recommended colonoscopy with possible biopsy or polypectomy and I explained the risk, benefits, expected outcome, and alternatives to the procedure. Risks included but are not limited to bleeding, infection, respiratory distress, hypotension, and perforation of the colon and possibility of missing a lesion. The patient understands and is in agreement. The patient was counseled at length about the risks of lory Covid-19 during their perioperative period and any recovery window from their procedure. The patient was made aware that lory Covid-19  may worsen their prognosis for recovering from their procedure  and lend to a higher morbidity and/or mortality risk. All material risks, benefits, and reasonable alternatives including postponing the procedure were discussed. The patient does wish to proceed with the procedure at this time. PROCEDURE: The patient was given IV conscious sedation. The patient's SPO2 remained above 90% throughout the procedure. The colonoscope was inserted per rectum and advanced under direct vision to the cecum without difficulty. Post sedation note : The patient's SPO2 remained above 90% throughout the procedure. the vital signs remained stable , and no immediate complication form the procedure noted, patient will be ready for d/c when criteria is met . The prep was good. Findings:        The patient has large polypoidal lesion at 30 cm from the anal verge with stalk for which we felt that it is amenable for endoscopic removal especially to avoid surgery on this patient who had end-stage liver disease  For which I went ahead and snared it, and after that the patient had a vessel in the polypectomy site which was spurting blood briskly  For which I did inject epinephrine around it to slow it down, and then clipped it with a clip and hemostasis was accomplished quickly, we closed the mucosal defect with 2 other clips  And the patient stabilized  Of note the patient did not have any abnormality of his vitals during this at all, but because of his situation with the end-stage liver disease  We decided to admit him for 23-hour hold  Given 1 unit of blood because his hemoglobin is low to start with  And watch make sure he does not rebleed  Dr. Michael Hart was called and I discussed the case with him and he was kind enough to admit the patient under his service      There is a cecal polyp also which was measuring 12 mm removed with a snare and retrieved    There is 2 polyps at the hepatic flexure area the larger of which was 1.2 cm which also snared and retrieved      The patient also had diverticulosis    In his rectum also showed engorged veins and hemorrhoids      Withdrawal Time was (minutes): 30    The colon was decompressed and the scope was removed. The patient tolerated the procedure well.      Recommendations/Plan:   Lifestyle and dietary modifications as discussed  As stated above Case discussed with Dr. Michael Hart, will admit under medicine service for 23-hour hold with H&H monitoring every 6 hours  We will check stat PT PTT INR and see if the patient need any FFP's  We will check CBC and see if the patient need any platelet transfusion  We will keep the patient n.p.o. for now      Electronically signed by Natalia Leger MD on 2/22/2023 at 4:57 PM

## 2023-02-22 NOTE — ANESTHESIA POSTPROCEDURE EVALUATION
POST- ANESTHESIA EVALUATION       Pt Name: Lucía Corcoran  MRN: [id number]  [place]: 1960  Date of evaluation: 2/22/2023  Time:  3:27 PM      /82   Pulse 78   Temp 98.3 °F (36.8 °C) (Oral)   Resp 16   Ht 6' 1\" (1.854 m)   Wt 200 lb (90.7 kg)   SpO2 99%   BMI 26.39 kg/m²      Consciousness Level  Awake  Cardiopulmonary Status  Stable  Pain Adequately Treated YES  Nausea / Vomiting  NO  Adequate Hydration  YES  Anesthesia Related Complications NONE      Electronically signed by Dick Brooks MD on 2/22/2023 at 3:27 PM       Department of Anesthesiology  Postprocedure Note    Patient: Lucía Corcoran  MRN: [id number]  YOB: 1960  Date of evaluation: 2/22/2023      Procedure Summary     Date: 02/22/23 Room / Location: [address] [place] [address] 02 / [facility] ENDO    Anesthesia Start: 1148 Anesthesia Stop: 5562    Procedure: COLONOSCOPY POLYPECTOMY HOT SNARE WITH RESOLUTION CLIPS X4 WITH EPI INJECTION Diagnosis:       Dysplastic polyp of colon      (Dysplastic polyp of colon [K63.5])    Surgeons: Judit Moore MD Responsible Provider: Dick Brooks MD    Anesthesia Type: TIVA ASA Status: 3          Anesthesia Type: TIVA    Lucretia Phase I: Lucretia Score: 8    Lucretia Phase II:        Anesthesia Post Evaluation

## 2023-02-22 NOTE — ANESTHESIA PRE PROCEDURE
Department of Anesthesiology  Preprocedure Note       Name:  Minh Moore   Age:  63 y.o.  :  1960                                          MRN:  [id number]         Date:  2023      Surgeon: Raghu Villa):  Prince Jerome MD    Procedure: Procedure(s):  COLONOSCOPY DIAGNOSTIC    Medications prior to admission:   Prior to Admission medications    Medication Sig Start Date End Date Taking? Authorizing Provider   sodium-potassium-mag sulfate (SUPREP BOWEL PREP KIT) 17.5-3.13-1.6 GM/177ML SOLN solution Please follow instructions given to you by your provider 2/10/23   Prince Jerome MD   bisacodyl 5 MG EC tablet Please follow instructions given to you by your provider 2/10/23   Prince Jerome MD   Cyanocobalamin (VITAMIN B 12 PO) Take by mouth  Patient not taking: No sig reported    Historical Provider, MD       Current medications:    Current Facility-Administered Medications   Medication Dose Route Frequency Provider Last Rate Last Admin    lidocaine PF 1 % injection 1 mL  1 mL IntraDERmal Once PRN Kayla Meza MD        lactated ringers IV soln infusion   IntraVENous Continuous Kayla Meza MD        sodium chloride flush 0.9 % injection 5-40 mL  5-40 mL IntraVENous 2 times per day Kayla Meza MD        sodium chloride flush 0.9 % injection 5-40 mL  5-40 mL IntraVENous PRN Kayla Meza MD        0.9 % sodium chloride infusion   IntraVENous PRN Kayla Meza MD           Allergies:     Allergies   Allergen Reactions    Pcn [Penicillins]      Unknown reaction       Problem List:    Patient Active Problem List   Diagnosis Code    Hyperbilirubinemia G03.5    Alcoholic hepatitis without ascites K70.10    Unintentional weight loss R63.4    Hypotension due to hypovolemia I95.89, E86.1    Anemia D64.9    Hyponatremia E87.1    Hypokalemia E87.6    Alcohol abuse F10.10    Severe malnutrition ([facility] [address] E43       Past Medical History:        Diagnosis Date    Cirrhosis ([facility] [address]     History of blood transfusion Past Surgical History:        Procedure Laterality Date    COLONOSCOPY      ENDOSCOPY, COLON, DIAGNOSTIC      TONSILLECTOMY         Social History:    Social History     Tobacco Use    Smoking status: Never    Smokeless tobacco: Former   Substance Use Topics    Alcohol use: Not Currently     Comment: quit 8/2022                                Counseling given: Not Answered      Vital Signs (Current): There were no vitals filed for this visit. BP Readings from Last 3 Encounters:   02/06/23 106/64   09/12/22 (!) 109/91   03/28/20 (!) 90/59       NPO Status:                                                                                 BMI:   Wt Readings from Last 3 Encounters:   02/08/23 199 lb (90.3 kg)   02/13/23 199 lb (90.3 kg)   02/06/23 199 lb (90.3 kg)     There is no height or weight on file to calculate BMI.    CBC:   Lab Results   Component Value Date/Time    WBC 14.5 09/12/2022 12:08 PM    RBC 2.88 09/12/2022 12:08 PM    HGB 11.3 09/12/2022 12:08 PM    HCT 32.3 09/12/2022 12:08 PM    .2 09/12/2022 12:08 PM    RDW 16.2 09/12/2022 12:08 PM     09/12/2022 12:08 PM       CMP:   Lab Results   Component Value Date/Time     04/21/2020 08:18 AM    K 4.4 04/21/2020 08:18 AM     04/21/2020 08:18 AM    CO2 20 04/21/2020 08:18 AM    BUN 14 04/21/2020 08:18 AM    CREATININE 1.4 02/13/2023 07:30 AM    CREATININE 0.72 04/21/2020 08:18 AM    GFRAA >60 04/21/2020 08:18 AM    LABGLOM >60 04/21/2020 08:18 AM    GLUCOSE 113 04/21/2020 08:18 AM    PROT 7.2 09/12/2022 12:08 PM    CALCIUM 9.1 04/21/2020 08:18 AM    BILITOT 19.1 09/12/2022 12:08 PM    ALKPHOS 290 09/12/2022 12:08 PM    AST 94 09/12/2022 12:08 PM    ALT 32 09/12/2022 12:08 PM       POC Tests: No results for input(s): POCGLU, POCNA, POCK, POCCL, POCBUN, POCHEMO, POCHCT in the last 72 hours.     Coags:   Lab Results   Component Value Date/Time    PROTIME 12.4 04/21/2020 08:18 AM    INR 0.9 04/21/2020 08:18 AM    APTT 31.6 03/26/2020 02:35 PM       HCG (If Applicable): No results found for: PREGTESTUR, PREGSERUM, HCG, HCGQUANT     ABGs: No results found for: PHART, PO2ART, YIC0TAR, MEL8GTD, BEART, D8BENAZI     Type & Screen (If Applicable):  No results found for: LABABO, LABRH    Drug/Infectious Status (If Applicable):  Lab Results   Component Value Date/Time    HEPCAB NONREACTIVE 03/26/2020 02:35 PM       COVID-19 Screening (If Applicable): No results found for: COVID19        Anesthesia Evaluation  Patient summary reviewed and Nursing notes reviewed no history of anesthetic complications:   Airway: Mallampati: II  TM distance: >3 FB   Neck ROM: full  Mouth opening: > = 3 FB   Dental: normal exam         Pulmonary:Negative Pulmonary ROS and normal exam  breath sounds clear to auscultation                             Cardiovascular:          ECG reviewed  Rhythm: regular  Rate: normal                    Neuro/Psych:   (+) neuromuscular disease:,             GI/Hepatic/Renal:   (+) liver disease:, bowel prep,          ROS comment: Liver cirrhosis. Endo/Other:                     Abdominal:             Vascular: negative vascular ROS. Other Findings:           Anesthesia Plan      general and TIVA     ASA 3       Induction: intravenous. Anesthetic plan and risks discussed with patient. Plan discussed with CRNA.                     Richie Marinelli MD   2/22/2023

## 2023-02-22 NOTE — H&P
[facility] [address]    HISTORY AND PHYSICAL EXAMINATION            Date:   2/22/2023  Patient name:  Bahman Gee  Date of admission:  2/22/2023  9:18 AM  MRN:   [id number]  Account:  [de-identified]  YOB: 1960  PCP:    Feliz Deleon MD  Room:   2088/2088-01  Code Status:    Full Code    Chief Complaint:     No chief complaint on file. History Obtained From:     patient, electronic medical record    History of Present Illness: The patient is a 63 y.o. Non- / non  male who presents with No chief complaint on file. and he is admitted to the hospital for the management of GI bleed  Patient, has past medical history alcoholic cirrhosis of liver, quit drinking already, history of malignant polyps in colon. Patient was told couple of years ago on colonoscopy that he has malignant polyps, he need to have repeat colonoscopy  Patient lost his insurance, and lost to follow-up  He was recently seen by Dr. Panchito Jaquez in his office, patient underwent diagnostic colonoscopy, 4 cm polyp was snared, patient started to bleed after resection of polyp. Resection clips were placed. Patient GI bleed has completely resolved  No complaint abdominal pain  Patient is currently getting 1 unit of packed cell transfusion  Patient admitted to [facility] for overnight observation        Past Medical History:     Past Medical History:   Diagnosis Date    Cirrhosis ([address]     History of blood transfusion         Past Surgical History:     Past Surgical History:   Procedure Laterality Date    COLONOSCOPY      COLONOSCOPY N/A 2/22/2023    COLONOSCOPY POLYPECTOMY HOT SNARE WITH RESOLUTION CLIPS X4 WITH EPI INJECTION performed by Feliz Deleon MD at [address], [place], DIAGNOSTIC      TONSILLECTOMY          Medications Prior to Admission:     Prior to Admission medications    Medication Sig Start Date End Date Taking?  Authorizing Provider sodium-potassium-mag sulfate (SUPREP BOWEL PREP KIT) 17.5-3.13-1.6 GM/177ML SOLN solution Please follow instructions given to you by your provider 2/10/23   Kai Marcos MD   bisacodyl 5 MG EC tablet Please follow instructions given to you by your provider 2/10/23   Kai Marcos MD   Cyanocobalamin (VITAMIN B 12 PO) Take by mouth  Patient not taking: No sig reported    Historical Provider, MD        Allergies:     Pcn [penicillins]    Social History:     Tobacco:    reports that he has never smoked. He has quit using smokeless tobacco.  Alcohol:      reports that he does not currently use alcohol. Drug Use:  reports no history of drug use. Family History:     Family History   Problem Relation Age of Onset    Thyroid Disease Mother     Lymphoma Father        Review of Systems:     Positive and Negative as described in HPI. CONSTITUTIONAL:  negative for fevers, chills, sweats, fatigue, weight loss  HEENT:  negative for vision, hearing changes, runny nose, throat pain  RESPIRATORY:  negative for shortness of breath, cough, congestion, wheezing. CARDIOVASCULAR:  negative for chest pain, palpitations.   GASTROINTESTINAL: GI bleed with recent colonoscopy which is completely resolved  GENITOURINARY:  negative for difficulty of urination, burning with urination, frequency   INTEGUMENT:  negative for rash, skin lesions, easy bruising   HEMATOLOGIC/LYMPHATIC:  negative for swelling/edema   ALLERGIC/IMMUNOLOGIC:  negative for urticaria , itching  ENDOCRINE:  negative increase in drinking, increase in urination, hot or cold intolerance  MUSCULOSKELETAL:  negative joint pains, muscle aches, swelling of joints  NEUROLOGICAL:  negative for headaches, dizziness, lightheadedness, numbness, pain, tingling extremities  BEHAVIOR/PSYCH:  negative for depression, anxiety    Physical Exam:   /82   Pulse 78   Temp 98.3 °F (36.8 °C) (Oral)   Resp 16   Ht 6' 1\" (1.854 m)   Wt 200 lb (90.7 kg)   SpO2 99%   BMI 26.39 kg/m²   Temp (24hrs), Av.6 °F (36.4 °C), Min:97.1 °F (36.2 °C), Max:98.3 °F (36.8 °C)    No results for input(s): POCGLU in the last 72 hours. Intake/Output Summary (Last 24 hours) at 2023 1554  Last data filed at 2023 1349  Gross per 24 hour   Intake 1000.75 ml   Output 200 ml   Net 800.75 ml       General Appearance:  alert, well appearing, and in no acute distress  Mental status: oriented to person, place, and time with normal affect  Head:  normocephalic, atraumatic. Eye: no icterus, redness, pupils equal and reactive, extraocular eye movements intact, conjunctiva clear  Ear: normal external ear, no discharge, hearing intact  Nose:  no drainage noted  Mouth: mucous membranes moist  Neck: supple, no carotid bruits, thyroid not palpable  Lungs: Bilateral equal air entry, clear to ausculation, no wheezing, rales or rhonchi, normal effort  Cardiovascular: normal rate, regular rhythm, no murmur, gallop, rub.   Abdomen: Soft, nontender, nondistended, normal bowel sounds, no hepatomegaly or splenomegaly  Neurologic: There are no new focal motor or sensory deficits, normal muscle tone and bulk, no abnormal sensation, normal speech, cranial nerves II through XII grossly intact  Skin: No gross lesions, rashes, bruising or bleeding on exposed skin area  Extremities:  peripheral pulses palpable, no pedal edema or calf pain with palpation  Psych: normal affect     Investigations:      Laboratory Testing:  Recent Results (from the past 24 hour(s))   TYPE AND SCREEN    Collection Time: 23  1:10 PM   Result Value Ref Range    Expiration Date 2023,3459     Arm Band Number [id number]     ABO/Rh O POSITIVE     Antibody Screen NEGATIVE     Blood Bank Comment Pt's ABRh confirmed as O POS:004     Blood Bank Comment Results Verified     Unit Number [id number]     Product Code Leukocyte Reduced Red Cell     Unit Divison 00     Dispense Status ISSUED     Unit Issue Date/Time 094182740983     Product Code Blood Bank L0531N69     Blood Bank Unit Type and Rh O POS     Blood Bank ISBT Product Blood Type 5100     Blood Bank Blood Product Expiration Date 285173494361     Transfusion Status OK TO TRANSFUSE     Crossmatch Result COMPATIBLE     Unit Number [id number]     Product Code Leukocyte Reduced Red Cell     Unit Divison 00     Dispense Status ALLOCATED     Transfusion Status OK TO TRANSFUSE     Crossmatch Result COMPATIBLE    CBC with Auto Differential    Collection Time: 02/22/23  1:10 PM   Result Value Ref Range    WBC 3.4 (L) 3.5 - 11.0 k/uL    RBC 3.52 (L) 4.5 - 5.9 m/uL    Hemoglobin 8.6 (L) 13.5 - 17.5 g/dL    Hematocrit 26.6 (L) 41 - 53 %    MCV 75.7 (L) 80 - 100 fL    MCH 24.5 (L) 26 - 34 pg    MCHC 32.3 31 - 37 g/dL    RDW 20.0 (H) 11.5 - 14.9 %    Platelets 696 (L) 464 - 450 k/uL    MPV 8.0 6.0 - 12.0 fL    Seg Neutrophils 65 36 - 66 %    Lymphocytes 21 (L) 24 - 44 %    Monocytes 9 (H) 1 - 7 %    Eosinophils % 3 0 - 4 %    Basophils 2 0 - 2 %    Segs Absolute 2.21 1.3 - 9.1 k/uL    Absolute Lymph # 0.71 (L) 1.0 - 4.8 k/uL    Absolute Mono # 0.31 0.1 - 1.3 k/uL    Absolute Eos # 0.10 0.0 - 0.4 k/uL    Basophils Absolute 0.07 0.0 - 0.2 k/uL    Morphology ANISOCYTOSIS PRESENT     Morphology HYPOCHROMIA PRESENT     Morphology 1+ ELLIPTOCYTES    Protime-INR    Collection Time: 02/22/23  1:10 PM   Result Value Ref Range    Protime 17.3 (H) 11.8 - 14.6 sec    INR 1.4    APTT pre-op    Collection Time: 02/22/23  1:10 PM   Result Value Ref Range    PTT 35.6 24.0 - 36.0 sec       Imaging/Diagnostics:        Assessment :      Primary Problem  S/P colonoscopic polypectomy    Active Hospital Problems    Diagnosis Date Noted    S/P colonoscopic polypectomy [Z98.890] 02/22/2023     Priority: Medium       Plan:     Patient status Admit as inpatient in the  Progressive Unit/Step down    Patient admitted with GI bleed after diagnostic colonoscopy, 4 cm polyp was removed, biopsy pending  Patient had retention clips placed, no more GI bleed, has not passed stools since the procedure  Patient getting 1 unit of packed cell transfusion, ordering H&H every 6  SCD for DVT prophylaxis  INR is 1.4,  Patient has recent lab work done including AFP, work-up for cirrhosis which is negative, has history of alcohol related cirrhosis of liver, he quit drinking already  Will observe H&H closely, will more packed cells if hemoglobin drop less than 7  GI consulted  Started on diet    Consultations:   Heard [place]bershire TO GI     Patient is admitted as inpatient status because of co-morbidities listed above, severity of signs and symptoms as outlined, requirement for current medical therapies and most importantly because of direct risk to patient if care not provided in a hospital setting. Eusebia Lees MD  2/22/2023  3:54 PM    Copy sent to Dr. Guillermina Mckeon MD    Please note that this chart was generated using voice recognition Dragon dictation software. Although every effort was made to ensure the accuracy of this automated transcription, some errors in transcription may have occurred.

## 2023-02-23 VITALS
SYSTOLIC BLOOD PRESSURE: 103 MMHG | HEIGHT: 73 IN | DIASTOLIC BLOOD PRESSURE: 72 MMHG | BODY MASS INDEX: 26.51 KG/M2 | TEMPERATURE: 97.3 F | RESPIRATION RATE: 16 BRPM | OXYGEN SATURATION: 99 % | WEIGHT: 200 LBS | HEART RATE: 67 BPM

## 2023-02-23 LAB
HCT VFR BLD AUTO: 25.8 % (ref 41–53)
HCT VFR BLD AUTO: 25.9 % (ref 41–53)
HGB BLD-MCNC: 8.1 G/DL (ref 13.5–17.5)
HGB BLD-MCNC: 8.2 G/DL (ref 13.5–17.5)

## 2023-02-23 PROCEDURE — 36415 COLL VENOUS BLD VENIPUNCTURE: CPT

## 2023-02-23 PROCEDURE — 85018 HEMOGLOBIN: CPT

## 2023-02-23 PROCEDURE — 85014 HEMATOCRIT: CPT

## 2023-02-23 PROCEDURE — 99239 HOSP IP/OBS DSCHRG MGMT >30: CPT | Performed by: INTERNAL MEDICINE

## 2023-02-23 PROCEDURE — 99231 SBSQ HOSP IP/OBS SF/LOW 25: CPT | Performed by: INTERNAL MEDICINE

## 2023-02-23 PROCEDURE — G0378 HOSPITAL OBSERVATION PER HR: HCPCS

## 2023-02-23 RX ORDER — POTASSIUM CHLORIDE 20 MEQ/1
40 TABLET, EXTENDED RELEASE ORAL ONCE
Status: DISCONTINUED | OUTPATIENT
Start: 2023-02-23 | End: 2023-02-23

## 2023-02-23 NOTE — PROGRESS NOTES
Patient educated on discharge instructions which include: medication schedule, reasons for new medications and some side effects and need to follow-up. Patient given new prescriptions during teaching. Patient verbalizes understanding of discharge and states readiness for discharge. All belongings were gathered by patient and in patient's possession. No distress noted at this time. Current vital signs:  /72   Pulse 67   Temp 97.3 °F (36.3 °C) (Oral)   Resp 16   Ht 6' 1\" (1.854 m)   Wt 200 lb (90.7 kg)   SpO2 99%   BMI 26.39 kg/m²                MEWS Score: 1    Pt brought own via wheelchair. Instructed to not return to work until Monday.

## 2023-02-23 NOTE — PROGRESS NOTES
[facility] [address]    HISTORY AND PHYSICAL EXAMINATION            Date:   2/23/2023  Patient name:  Marissa Campos  Date of admission:  2/22/2023  9:18 AM  MRN:   [id number]  Account:  [de-identified]  YOB: 1960  PCP:    Ciara Paez MD  Room:   2088/2088-01  Code Status:    Full Code    Chief Complaint:     No chief complaint on file. History Obtained From:     patient, electronic medical record    History of Present Illness: The patient is a 63 y.o. Non- / non  male who presents with No chief complaint on file. and he is admitted to the hospital for the management of GI bleed  Patient, has past medical history alcoholic cirrhosis of liver, quit drinking already, history of malignant polyps in colon. Patient was told couple of years ago on colonoscopy that he has malignant polyps, he need to have repeat colonoscopy  Patient lost his insurance, and lost to follow-up  He was recently seen by Dr. Roxana Magana in his office, patient underwent diagnostic colonoscopy, 4 cm polyp was snared, patient started to bleed after resection of polyp. Resection clips were placed.    Patient GI bleed has completely resolved  No complaint abdominal pain  Patient is currently getting 1 unit of packed cell transfusion  Patient admitted to [facility] for overnight observation    2/23  Patient, clinically doing better  No more bleeding  Has not passed bowel movement  Eating drinking okay    Past Medical History:     Past Medical History:   Diagnosis Date    Cirrhosis ([address]     History of blood transfusion         Past Surgical History:     Past Surgical History:   Procedure Laterality Date    COLONOSCOPY      COLONOSCOPY N/A 2/22/2023    COLONOSCOPY POLYPECTOMY HOT SNARE WITH RESOLUTION CLIPS X4 WITH EPI INJECTION performed by Ciara Paez MD at [address] [place] [address], DIAGNOSTIC      TONSILLECTOMY          Medications Prior to Admission: Prior to Admission medications    Medication Sig Start Date End Date Taking? Authorizing Provider   sodium-potassium-mag sulfate (SUPREP BOWEL PREP KIT) 17.5-3.13-1.6 GM/177ML SOLN solution Please follow instructions given to you by your provider 2/10/23   Eduin Melgar MD   bisacodyl 5 MG EC tablet Please follow instructions given to you by your provider 2/10/23   Eduin Melgar MD   Cyanocobalamin (VITAMIN B 12 PO) Take by mouth  Patient not taking: No sig reported    Historical Provider, MD        Allergies:     Pcn [penicillins]    Social History:     Tobacco:    reports that he has never smoked. He has quit using smokeless tobacco.  Alcohol:      reports that he does not currently use alcohol. Drug Use:  reports no history of drug use. Family History:     Family History   Problem Relation Age of Onset    Thyroid Disease Mother     Lymphoma Father        Review of Systems:     Positive and Negative as described in HPI. CONSTITUTIONAL:  negative for fevers, chills, sweats, fatigue, weight loss  HEENT:  negative for vision, hearing changes, runny nose, throat pain  RESPIRATORY:  negative for shortness of breath, cough, congestion, wheezing. CARDIOVASCULAR:  negative for chest pain, palpitations.   GASTROINTESTINAL: GI bleed with recent colonoscopy which is completely resolved  GENITOURINARY:  negative for difficulty of urination, burning with urination, frequency   INTEGUMENT:  negative for rash, skin lesions, easy bruising   HEMATOLOGIC/LYMPHATIC:  negative for swelling/edema   ALLERGIC/IMMUNOLOGIC:  negative for urticaria , itching  ENDOCRINE:  negative increase in drinking, increase in urination, hot or cold intolerance  MUSCULOSKELETAL:  negative joint pains, muscle aches, swelling of joints  NEUROLOGICAL:  negative for headaches, dizziness, lightheadedness, numbness, pain, tingling extremities  BEHAVIOR/PSYCH:  negative for depression, anxiety    Physical Exam:   /72   Pulse 67   Temp 97.3 °F (36.3 °C) (Oral)   Resp 16   Ht 6' 1\" (1.854 m)   Wt 200 lb (90.7 kg)   SpO2 99%   BMI 26.39 kg/m²   Temp (24hrs), Av.8 °F (36.6 °C), Min:97.1 °F (36.2 °C), Max:98.6 °F (37 °C)    No results for input(s): POCGLU in the last 72 hours. Intake/Output Summary (Last 24 hours) at 2023 1136  Last data filed at 2023 1023  Gross per 24 hour   Intake 2900.48 ml   Output 200 ml   Net 2700.48 ml       General Appearance:  alert, well appearing, and in no acute distress  Mental status: oriented to person, place, and time with normal affect  Head:  normocephalic, atraumatic. Eye: no icterus, redness, pupils equal and reactive, extraocular eye movements intact, conjunctiva clear  Ear: normal external ear, no discharge, hearing intact  Nose:  no drainage noted  Mouth: mucous membranes moist  Neck: supple, no carotid bruits, thyroid not palpable  Lungs: Bilateral equal air entry, clear to ausculation, no wheezing, rales or rhonchi, normal effort  Cardiovascular: normal rate, regular rhythm, no murmur, gallop, rub.   Abdomen: Soft, nontender, nondistended, normal bowel sounds, no hepatomegaly or splenomegaly  Neurologic: There are no new focal motor or sensory deficits, normal muscle tone and bulk, no abnormal sensation, normal speech, cranial nerves II through XII grossly intact  Skin: No gross lesions, rashes, bruising or bleeding on exposed skin area  Extremities:  peripheral pulses palpable, no pedal edema or calf pain with palpation  Psych: normal affect     Investigations:      Laboratory Testing:  Recent Results (from the past 24 hour(s))   TYPE AND SCREEN    Collection Time: 23  1:10 PM   Result Value Ref Range    Expiration Date 2023,9105     Arm Band Number [id number]     ABO/Rh O POSITIVE     Antibody Screen NEGATIVE     Blood Bank Comment Pt's ABRh confirmed as O POS:004     Blood Bank Comment Results Verified     Unit Number [id number]     Product Code Leukocyte Reduced Red Cell     Unit Divison 00     Dispense Status ISSUED     Unit Issue Date/Time 201186222908     Product Code Blood Bank R8167I78     Blood Bank Unit Type and Rh O POS     Blood Bank ISBT Product Blood Type 5100     Blood Bank Blood Product Expiration Date 623970693658     Transfusion Status OK TO TRANSFUSE     Crossmatch Result COMPATIBLE     Unit Number [id number]     Product Code Leukocyte Reduced Red Cell     Unit Divison 00     Dispense Status ALLOCATED     Transfusion Status OK TO TRANSFUSE     Crossmatch Result COMPATIBLE    CBC with Auto Differential    Collection Time: 02/22/23  1:10 PM   Result Value Ref Range    WBC 3.4 (L) 3.5 - 11.0 k/uL    RBC 3.52 (L) 4.5 - 5.9 m/uL    Hemoglobin 8.6 (L) 13.5 - 17.5 g/dL    Hematocrit 26.6 (L) 41 - 53 %    MCV 75.7 (L) 80 - 100 fL    MCH 24.5 (L) 26 - 34 pg    MCHC 32.3 31 - 37 g/dL    RDW 20.0 (H) 11.5 - 14.9 %    Platelets 401 (L) 415 - 450 k/uL    MPV 8.0 6.0 - 12.0 fL    Seg Neutrophils 65 36 - 66 %    Lymphocytes 21 (L) 24 - 44 %    Monocytes 9 (H) 1 - 7 %    Eosinophils % 3 0 - 4 %    Basophils 2 0 - 2 %    Segs Absolute 2.21 1.3 - 9.1 k/uL    Absolute Lymph # 0.71 (L) 1.0 - 4.8 k/uL    Absolute Mono # 0.31 0.1 - 1.3 k/uL    Absolute Eos # 0.10 0.0 - 0.4 k/uL    Basophils Absolute 0.07 0.0 - 0.2 k/uL    Morphology ANISOCYTOSIS PRESENT     Morphology HYPOCHROMIA PRESENT     Morphology 1+ ELLIPTOCYTES    Protime-INR    Collection Time: 02/22/23  1:10 PM   Result Value Ref Range    Protime 17.3 (H) 11.8 - 14.6 sec    INR 1.4    APTT pre-op    Collection Time: 02/22/23  1:10 PM   Result Value Ref Range    PTT 35.6 24.0 - 36.0 sec   Comprehensive Metabolic Panel w/ Reflex to MG    Collection Time: 02/22/23  7:37 PM   Result Value Ref Range    Glucose 125 (H) 70 - 99 mg/dL    BUN 14 8 - 23 mg/dL    Creatinine 1.00 0.70 - 1.20 mg/dL    Est, Glom Filt Rate >60 >60 mL/min/1.73m2    Calcium 8.4 (L) 8.6 - 10.4 mg/dL    Sodium 140 135 - 144 mmol/L    Potassium 3.3 (L) 3.7 - 5.3 mmol/L Chloride 108 (H) 98 - 107 mmol/L    CO2 22 20 - 31 mmol/L    Anion Gap 10 9 - 17 mmol/L    Alkaline Phosphatase 69 40 - 129 U/L    ALT 9 5 - 41 U/L    AST 18 <40 U/L    Total Bilirubin 0.8 0.3 - 1.2 mg/dL    Total Protein 6.8 6.4 - 8.3 g/dL    Albumin 3.7 3.5 - 5.2 g/dL   Hemoglobin and Hematocrit    Collection Time: 02/22/23  7:37 PM   Result Value Ref Range    Hemoglobin 8.8 (L) 13.5 - 17.5 g/dL    Hematocrit 28.4 (L) 41 - 53 %   Magnesium    Collection Time: 02/22/23  7:37 PM   Result Value Ref Range    Magnesium 1.7 1.6 - 2.6 mg/dL   Hemoglobin and Hematocrit    Collection Time: 02/22/23  9:14 PM   Result Value Ref Range    Hemoglobin 8.4 (L) 13.5 - 17.5 g/dL    Hematocrit 27.4 (L) 41 - 53 %   Hemoglobin and Hematocrit    Collection Time: 02/23/23  3:00 AM   Result Value Ref Range    Hemoglobin 8.1 (L) 13.5 - 17.5 g/dL    Hematocrit 25.8 (L) 41 - 53 %   Hemoglobin and Hematocrit    Collection Time: 02/23/23  9:32 AM   Result Value Ref Range    Hemoglobin 8.2 (L) 13.5 - 17.5 g/dL    Hematocrit 25.9 (L) 41 - 53 %       Imaging/Diagnostics:        Assessment :      Primary Problem  S/P colonoscopic polypectomy    Active Hospital Problems    Diagnosis Date Noted    S/P colonoscopic polypectomy [Z98.890] 02/22/2023     Priority: Medium    Dysplastic polyp of colon [K63.5] 02/22/2023     Priority: Medium       Plan:     Patient status Admit as inpatient in the  Progressive Unit/Step down    Patient admitted with GI bleed after diagnostic colonoscopy, 4 cm polyp was removed, biopsy pending  Patient had retention clips placed, no more GI bleed, has not passed stools since the procedure  Patient getting 1 unit of packed cell transfusion, ordering H&H every 6  SCD for DVT prophylaxis  INR is 1.4,  Patient has recent lab work done including AFP, work-up for cirrhosis which is negative, has history of alcohol related cirrhosis of liver, he quit drinking already  Will observe H&H closely, will more packed cells if hemoglobin drop less than 7  GI consulted  Started on diet  2/23  Patient, doing better  No more bleeding  Replacing potassium  DC plan  Tolerating regular diet  We will follow with GI as outpatient  Consultations:   IP CONSULT TO INTERNAL MEDICINE  IP CONSULT TO GI     Patient is admitted as inpatient status because of co-morbidities listed above, severity of signs and symptoms as outlined, requirement for current medical therapies and most importantly because of direct risk to patient if care not provided in a hospital setting. Herlinda Bernal MD  2/23/2023  11:36 AM    Copy sent to Dr. Kai Marcos MD    Please note that this chart was generated using voice recognition Dragon dictation software. Although every effort was made to ensure the accuracy of this automated transcription, some errors in transcription may have occurred.

## 2023-02-23 NOTE — PROGRESS NOTES
Gastroenterology Progress Note    Pau Lin is a 63 y.o. male patient. Hospitalization Day:1  SUBJECTIVE:      Chief Complaint: bleeding     S/p large polypectomy with bleeding  No abd pain    No n/v    Low K    No bleeding eric ll   No f/c   No abd pain    Tolerated reg diet         Physical    VITALS:  /72   Pulse 67   Temp 97.3 °F (36.3 °C) (Oral)   Resp 16   Ht 6' 1\" (1.854 m)   Wt 200 lb (90.7 kg)   SpO2 99%   BMI 26.39 kg/m²   TEMPERATURE:  Current - Temp: 97.3 °F (36.3 °C); Max - Temp  Av.8 °F (36.6 °C)  Min: 97.1 °F (36.2 °C)  Max: 98.6 °F (37 °C)    General:  Alert and oriented,  No apparent distress  Skin- without jaundice,rash,or cyanosis   Eyes: anicteric sclera,no erythema,PERRLA,EOMI  Nose:no bleeding  Gums:no bleeding,moist  ERAS:no discharge, good hearing  Cardiac: RRR, Nl s1s2, without murmurs,no edema LE  Lungs CTA Bilaterally, normal effort  Abdomen soft, ND, NT, no Hernia, Bowel sounds normal  Ext: without edema,no deformity,no clubbing  Neuro: no asterixis , CN intact, A&Ox3, strength symmetric  Psych: normal affect    Data    Data Review:    Recent Labs     23  1310 23  1937 23  2114 23  0300 23  0932   WBC 3.4*  --   --   --   --    HGB 8.6*   < > 8.4* 8.1* 8.2*   HCT 26.6*   < > 27.4* 25.8* 25.9*   MCV 75.7*  --   --   --   --    *  --   --   --   --     < > = values in this interval not displayed. Recent Labs     23      K 3.3*   *   CO2 22   BUN 14   CREATININE 1.00     Recent Labs     23   AST 18   ALT 9   BILITOT 0.8   ALKPHOS 69     No results for input(s): LIPASE, AMYLASE in the last 72 hours. Recent Labs     23  1310   PROTIME 17.3*   INR 1.4     No results for input(s): PTT in the last 72 hours. CEA:  No results for input(s): CEA in the last 72 hours. Ca 125:  No results for input(s):  in the last 72 hours.   Ca 19-9:   Invalid input(s):   AFP: No results for input(s): AFP in the last 72 hours. Lactic acid:Invalid input(s): LACTIC ACID   Radiology Review:          ASSESSMENT:  Principal Problem:    S/P colonoscopic polypectomy  Active Problems:    Dysplastic polyp of colon  Resolved Problems:    * No resolved hospital problems.  *        PLAN :    ETOH liver dx   S/p post large polypectomy with immediate bleed from a vessel, clipped and injected immediately, bleeding controled     Ok to d/c    Follow out pt   Explained to pt what to look for , and come back if re bleed explained that he could re bleeding could happened up to 3-4 week still  Avoid any blood thinners, or ASA or NSAIDs             Ny Hicks MD

## 2023-02-23 NOTE — PROGRESS NOTES
Physician Progress Note      PATIENTBelita Ahumada  [facility]:                  [id number]  :                       1960  ADMIT DATE:       2023 9:18 AM  DISCH DATE:        2023 12:27 PM  RESPONDING  PROVIDER #:        Herlinda Bernal MD        QUERY TEXT:    Type of Anemia: Please provide further specificity, if known. Clinical indicators include: anemia, blood loss, bleeding, 1 unit of blood,   hemoglobin, h&h, ffp, platelet transfusion  Options provided:  -- Anemia due to acute blood loss  -- Anemia due to chronic blood loss  -- Anemia due to iron deficiency  -- Anemia due to postoperative blood loss  -- Anemia due to chronic disease  -- Other - I will add my own diagnosis  -- Disagree - Not applicable / Not valid  -- Disagree - Clinically Unable to determine / Unknown        PROVIDER RESPONSE TEXT:    The patient has anemia due to acute blood loss.       Electronically signed by:  Herlinda Bernal MD 2023 3:47 PM

## 2023-02-23 NOTE — DISCHARGE SUMMARY
[place] [address] Internal Medicine    Discharge Summary     Patient ID: Marissa Campos  :  1960   MRN: [id number]     ACCOUNT:  [de-identified]   Patient's PCP: Ciara Paez MD  Admit Date: 2023   Discharge Date: 2023    Length of Stay: 1  Code Status:  Full Code  Admitting Physician: Hellen Stinson MD  Discharge Physician: Hellen Stinson MD     Active Discharge Diagnoses:     Primary Problem  S/P colonoscopic polypectomy      [facility] Problems    Diagnosis Date Noted    S/P colonoscopic polypectomy [Z98.890] 2023     Priority: Medium    Dysplastic polyp of colon [K63.5] 2023     Priority: Medium       Admission Condition: Poor     Discharged Condition: fair    Hospital Stay:     Hospital Course:  Marissa Campos is a 63 y.o. male who was admitted for the management of S/P colonoscopic polypectomy , presented to ER with No chief complaint on file. Patient, admitted for planned colostomy, he has malignant polyps, underwent resection of large polyp, retention clips were placed, patient was admitted for monitoring of hemoglobin  Hemoglobin stayed okay, no obvious bleeding  Patient has not passed BM  Tolerating diet  Had hypokalemia which was getting replaced  Patient getting discharged home will follow with GI as outpatient        Significant therapeutic interventions:     Significant Diagnostic Studies:   Labs / Micro:        ,     Radiology:    MRI ABDOMEN W WO CONTRAST MRCP    Result Date: 2023  EXAMINATION: MRI OF THE ABDOMEN WITH AND WITHOUT CONTRAST AND MRCP 2023 7:18 am TECHNIQUE: Multiplanar multisequence MRI of the abdomen was performed with and without the administration of intravenous contrast.  After initial T2 axial and coronal images, thick slab, thin slab and 3D coronal MRCP sequences were obtained without the administration of intravenous contrast.  MIP images are provided for review.  COMPARISON: 2020 HISTORY: ORDERING SYSTEM PROVIDED HISTORY: Dysplastic polyp of colon TECHNOLOGIST PROVIDED HISTORY: STAT Creatinine as needed:->Yes Reason for Exam: Cirrhosis of Liver FINDINGS: Motion artifact degrades the study. This decreases sensitivity and specificity of the exam Gallbladder: Gallbladder is contracted, accentuating its wall thickness. No intrahepatic ductal dilatation. No perihepatic fluid. .  There is subtle lobular contour to the liver. .  There is mild signal loss seen in liver on out of phase images suggesting fatty infiltration Bile Ducts: No intra or extrahepatic biliary ductal dilatation Pancreatic Duct: No pancreatic ductal dilatation. No peripancreatic fluid Right adrenal gland is normal.  Left adrenal gland is normal. No hydronephrosis on right. No hydronephrosis on left. A few small lymph nodes are seen in the miriam Spleen measures 13.1 cm in length on cranial caudal images No significant small bowel distention noted. Moderate stool seen in the colon Tiny periumbilical hernia containing fat is seen. No retroperitoneal adenopathy. No aortic aneurysm. No abnormally enhancing mass on postcontrast images     Motion limited. No abnormally enhancing mass identified Mild fatty infiltration of the liver. There is subtle lobular contour to the liver which could be an early finding of cirrhosis. There is borderline splenomegaly RECOMMENDATIONS: Unavailable         Consultations:    Consults:     Final Specialist Recommendations/Findings:   IP CONSULT TO INTERNAL MEDICINE  IP CONSULT TO GI      The patient was seen and examined on day of discharge and this discharge summary is in conjunction with any daily progress note from day of discharge. Discharge plan:     Disposition: Home    Physician Follow Up:     No follow-up provider specified.      Requiring Further Evaluation/Follow Up POST HOSPITALIZATION/Incidental Findings:    Diet: cardiac diet    Activity: As tolerated    Instructions to Patient: Discharge Medications:      Medication List        CONTINUE taking these medications      bisacodyl 5 MG EC tablet  Generic drug: bisacodyl  Please follow instructions given to you by your provider     sodium-potassium-mag sulfate 17.5-3.13-1.6 GM/177ML Soln solution  Commonly known as: Suprep Bowel Prep Kit  Please follow instructions given to you by your provider     VITAMIN B 12 PO              Time Spent on discharge is  35 mins in patient examination, evaluation, counseling as well as medication reconciliation, prescriptions for required medications, discharge plan and follow up. Electronically signed by   Jamey Fothergill, MD  2/23/2023  11:41 AM      Thank you Dr. Clint Jaramillo MD for the opportunity to be involved in this patient's care.

## 2023-02-23 NOTE — PLAN OF CARE
Problem: Discharge Planning  Goal: Discharge to home or other facility with appropriate resources  2/23/2023 1147 by John Paul Yao RN  Outcome: Adequate for Discharge  2/23/2023 0259 by Mariaa Aleman RN  Outcome: Progressing     Problem: ABCDS Injury Assessment  Goal: Absence of physical injury  2/23/2023 1147 by John Paul Yao RN  Outcome: Adequate for Discharge  2/23/2023 0259 by Mariaa Aleman RN  Outcome: Progressing

## 2023-02-23 NOTE — ANESTHESIA POSTPROCEDURE EVALUATION
Department of Anesthesiology  Postprocedure Note    Patient: Marissa Campos  MRN: [id number]  YOB: 1960  Date of evaluation: 2/23/2023      Procedure Summary     Date: 02/22/23 Room / Location: [address] [place] [address] 02 / 250 [facility] ENDO    Anesthesia Start: 1148 Anesthesia Stop: 3674    Procedure: COLONOSCOPY POLYPECTOMY HOT SNARE WITH RESOLUTION CLIPS X4 WITH EPI INJECTION (Rectum) Diagnosis:       Dysplastic polyp of colon      (Dysplastic polyp of colon [K63.5])    Surgeons: Ciara Paez MD Responsible Provider: Kristi Person MD    Anesthesia Type: TIVA ASA Status: 3          Anesthesia Type: TIVA    Lucretia Phase I: Lucretia Score: 8    Lucretia Phase II:        Anesthesia Post Evaluation    Comments: POD #1 Patient seen lying in bed. No anesthesia related issues.

## 2023-02-23 NOTE — CARE COORDINATION
Case Management Assessment  Initial Evaluation    Date/Time of Evaluation: 2/23/2023 11:37 AM  Assessment Completed by: Phill Zhao RN    If patient is discharged prior to next notation, then this note serves as note for discharge by case management. Patient Name: Bahman Gee                   YOB: 1960  Diagnosis: Dysplastic polyp of colon [K63.5]  S/P colonoscopic polypectomy [Z98.890]                   Date / Time: 2/22/2023  9:18 AM    Patient Admission Status: Inpatient   Readmission Risk (Low < 19, Mod (19-27), High > 27): Readmission Risk Score: 9.7    Current PCP: Feliz Deleon MD  PCP verified by CM? Yes    Chart Reviewed: Yes      History Provided by: Patient  Patient Orientation: Alert and Oriented    Patient Cognition: Alert    Hospitalization in the last 30 days (Readmission):  No    If yes, Readmission Assessment in CM Navigator will be completed. Advance Directives:      Code Status: Full Code   Patient's Primary Decision Maker is:        Discharge Planning:    Patient lives with: Alone Type of Home: House  Primary Care Giver: Self  Patient Support Systems include: Family Members   Current Financial resources: None  Current community resources: None  Current services prior to admission: None            Current DME:              Type of Home Care services:  None    ADLS  Prior functional level: Independent in ADLs/IADLs  Current functional level: Independent in ADLs/IADLs    PT AM-PAC:   /24  OT AM-PAC:   /24    Family can provide assistance at DC: Yes  Would you like Case Management to discuss the discharge plan with any other family members/significant others, and if so, who?  No  Plans to Return to Present Housing: Yes  Other Identified Issues/Barriers to RETURNING to current housing: No  Potential Assistance needed at discharge: N/A            Potential DME:  n/a  Patient expects to discharge to: [address] [place] [address] for transportation at discharge: Self    Financial    Payor: Mansoor De Jesus / Plan: Mansoor De Jesus / Product Type: *No Product type* /     Does insurance require precert for SNF: Yes    Potential assistance Purchasing Medications: No  Meds-to-Beds request:        [facility] [id number] [facility], [address] [facility]  [address] [facility] [address]  Phone: [phone] Fax: [phone] [address], [address]  [phone]  [address] [place] [address]  Phone: [phone] Fax: [phone]      Notes:    Factors facilitating achievement of predicted outcomes: Motivated, Cooperative, and Pleasant    Barriers to discharge: n/a    Additional Case Management Notes: from home alone, independent and drives. DME: none. Declined VNS. Denies needs. Will be d/c'ed home today. The Plan for Transition of Care is related to the following treatment goals of Dysplastic polyp of colon [K63.5]  S/P colonoscopic polypectomy [Z97.343]    IF APPLICABLE: The Patient and/or patient representative Ryan Hernandez and his family were provided with a choice of provider and agrees with the discharge plan. Freedom of choice list with basic dialogue that supports the patient's individualized plan of care/goals and shares the quality data associated with the providers was provided to: Patient   Patient Representative Name:       The Patient and/or Patient Representative Agree with the Discharge Plan?  Yes    Jasmeet Woo RN  Case Management Department  Ph: [phone] Fax: [phone]

## 2023-02-26 LAB
ABO/RH: NORMAL
ANTIBODY SCREEN: NEGATIVE
ARM BAND NUMBER: NORMAL
BLD PROD TYP BPU: NORMAL
BLD PROD TYP BPU: NORMAL
BLOOD BANK BLOOD PRODUCT EXPIRATION DATE: NORMAL
BLOOD BANK COMMENT: NORMAL
BLOOD BANK COMMENT: NORMAL
BLOOD BANK ISBT PRODUCT BLOOD TYPE: 5100
BLOOD BANK PRODUCT CODE: NORMAL
BLOOD BANK UNIT TYPE AND RH: NORMAL
BPU ID: NORMAL
BPU ID: NORMAL
CROSSMATCH RESULT: NORMAL
CROSSMATCH RESULT: NORMAL
DISPENSE STATUS BLOOD BANK: NORMAL
DISPENSE STATUS BLOOD BANK: NORMAL
EXPIRATION DATE: NORMAL
TRANSFUSION STATUS: NORMAL
TRANSFUSION STATUS: NORMAL
UNIT DIVISION: 0
UNIT DIVISION: 0
UNIT ISSUE DATE/TIME: NORMAL

## 2023-02-27 LAB — SURGICAL PATHOLOGY REPORT: NORMAL

## 2023-02-28 ENCOUNTER — OFFICE VISIT (OUTPATIENT)
Dept: INTERNAL MEDICINE CLINIC | Age: 63
End: 2023-02-28
Payer: COMMERCIAL

## 2023-02-28 VITALS
BODY MASS INDEX: 25.84 KG/M2 | WEIGHT: 195 LBS | SYSTOLIC BLOOD PRESSURE: 96 MMHG | HEIGHT: 73 IN | DIASTOLIC BLOOD PRESSURE: 60 MMHG

## 2023-02-28 DIAGNOSIS — C80.1: Primary | ICD-10-CM

## 2023-02-28 DIAGNOSIS — D50.0 IRON DEFICIENCY ANEMIA DUE TO CHRONIC BLOOD LOSS: ICD-10-CM

## 2023-02-28 DIAGNOSIS — H91.93 BILATERAL HEARING LOSS, UNSPECIFIED HEARING LOSS TYPE: ICD-10-CM

## 2023-02-28 PROCEDURE — 99202 OFFICE O/P NEW SF 15 MIN: CPT | Performed by: INTERNAL MEDICINE

## 2023-02-28 SDOH — ECONOMIC STABILITY: FOOD INSECURITY: WITHIN THE PAST 12 MONTHS, YOU WORRIED THAT YOUR FOOD WOULD RUN OUT BEFORE YOU GOT MONEY TO BUY MORE.: NEVER TRUE

## 2023-02-28 SDOH — ECONOMIC STABILITY: HOUSING INSECURITY
IN THE LAST 12 MONTHS, WAS THERE A TIME WHEN YOU DID NOT HAVE A STEADY PLACE TO SLEEP OR SLEPT IN A SHELTER (INCLUDING NOW)?: NO

## 2023-02-28 SDOH — ECONOMIC STABILITY: INCOME INSECURITY: HOW HARD IS IT FOR YOU TO PAY FOR THE VERY BASICS LIKE FOOD, HOUSING, MEDICAL CARE, AND HEATING?: NOT HARD AT ALL

## 2023-02-28 SDOH — ECONOMIC STABILITY: FOOD INSECURITY: WITHIN THE PAST 12 MONTHS, THE FOOD YOU BOUGHT JUST DIDN'T LAST AND YOU DIDN'T HAVE MONEY TO GET MORE.: NEVER TRUE

## 2023-02-28 ASSESSMENT — ENCOUNTER SYMPTOMS: ABDOMINAL PAIN: 1

## 2023-02-28 ASSESSMENT — PATIENT HEALTH QUESTIONNAIRE - PHQ9
SUM OF ALL RESPONSES TO PHQ QUESTIONS 1-9: 0
2. FEELING DOWN, DEPRESSED OR HOPELESS: 0
1. LITTLE INTEREST OR PLEASURE IN DOING THINGS: 0
SUM OF ALL RESPONSES TO PHQ9 QUESTIONS 1 & 2: 0
SUM OF ALL RESPONSES TO PHQ QUESTIONS 1-9: 0

## 2023-02-28 NOTE — PROGRESS NOTES
[address] [place] [id number]  Dept: [phone]  Dept Fax: [phone]    Chacorta Joseph is a 63 y.o. male who presents today for his medicalconditions/complaints as noted below. Chacorta Joseph is c/o of Annual Exam (New Patient, HFU. Review labs, patient reports low blood counts. ) and Hearing Problem (Patient reports hearing has worsened over the last month or two. )      HPI:     Anemia  Presents for initial visit. Symptoms include abdominal pain. Signs of blood loss that are present include melena. Past treatments include oral iron supplements. Past medical history includes cancer (found to have a cancerous polyp last week that was removed). Procedure history includes colonoscopy. Ear Fullness   There is pain in both ears. This is a new problem. The current episode started more than 1 month ago. The problem occurs constantly. There has been no fever. Associated symptoms include abdominal pain and hearing loss. Associated symptoms comments: Tinnitus. . He has tried nothing for the symptoms. The treatment provided no relief. Has  f/u Monday with Dr. Maximiliano Mullins. He is taking OTC iron daily. Will have him increase to twice a day. Past Medical History:   Diagnosis Date    Cirrhosis ([facility] [address]     History of blood transfusion         Current Outpatient Medications   Medication Sig Dispense Refill    Ferrous Sulfate (IRON PO) Take by mouth      Cyanocobalamin (VITAMIN B 12 PO) Take by mouth      sodium-potassium-mag sulfate (SUPREP BOWEL PREP KIT) 17.5-3.13-1.6 GM/177ML SOLN solution Please follow instructions given to you by your provider (Patient not taking: Reported on 2/28/2023) 1 each 0    bisacodyl 5 MG EC tablet Please follow instructions given to you by your provider (Patient not taking: Reported on 2/28/2023) 4 tablet 0     No current facility-administered medications for this visit.      Allergies   Allergen Reactions    Pcn [Penicillins] Unknown reaction       Health Maintenance   Topic Date Due    COVID-19 Vaccine (1) Never done    Hepatitis A vaccine (1 of 2 - Risk 2-dose series) Never done    Pneumococcal 0-64 years Vaccine (1 - PCV) Never done    Depression Screen  Never done    Hepatitis B vaccine (1 of 3 - Risk 3-dose series) Never done    DTaP/Tdap/Td vaccine (1 - Tdap) Never done    Diabetes screen  Never done    Lipids  Never done    Shingles vaccine (1 of 2) Never done    Flu vaccine (1) Never done    Colorectal Cancer Screen  02/22/2033    Hepatitis C screen  Completed    HIV screen  Completed    Hib vaccine  Aged Out    Meningococcal (ACWY) vaccine  Aged Out       Subjective:      Review of Systems   HENT:  Positive for hearing loss. Gastrointestinal:  Positive for abdominal pain and melena. All other systems reviewed and are negative. Objective:     Physical Exam  Vitals reviewed. Constitutional:       Appearance: He is well-developed. HENT:      Head: Normocephalic and atraumatic. Right Ear: Tympanic membrane and ear canal normal. There is no impacted cerumen. Left Ear: Tympanic membrane and ear canal normal. There is no impacted cerumen. Eyes:      Conjunctiva/sclera: Conjunctivae normal.      Pupils: Pupils are equal, round, and reactive to light. Neck:      Thyroid: No thyromegaly. Vascular: No JVD. Cardiovascular:      Rate and Rhythm: Normal rate and regular rhythm. Heart sounds: Normal heart sounds. No murmur heard. Pulmonary:      Effort: Pulmonary effort is normal.      Breath sounds: Normal breath sounds. Abdominal:      General: Bowel sounds are normal.      Palpations: Abdomen is soft. Musculoskeletal:         General: Normal range of motion. Cervical back: Normal range of motion and neck supple. Skin:     General: Skin is warm and dry. Neurological:      Mental Status: He is alert and oriented to person, place, and time.       Deep Tendon Reflexes: Reflexes are normal and symmetric. BP 96/60 (Site: Left Upper Arm)   Ht 6' 1\" (1.854 m)   Wt 195 lb (88.5 kg)   BMI 25.73 kg/m²       Assessment:       Diagnosis Orders   1. 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 Bilateral hearing loss, unspecified hearing loss type  External Referral To Audiology      3. Iron deficiency anemia due to chronic blood loss  Hemoglobin and Hematocrit          Plan:      No follow-ups on file. No orders of the defined types were placed in this encounter. Orders Placed This Encounter   Procedures    Hemoglobin and Hematocrit     Standing Status:   Future     Standing Expiration Date:   2/28/2024    External Referral To Audiology     Referral Priority:   Routine     Referral Type:   Eval and Treat     Referral Reason:   Specialty Services Required     Requested Specialty:   Audiology     Number of Visits Requested:   1              Patient given educational materials - see patient instructions. Discussed use, benefit, and side effects of prescribed medications. All patientquestions answered. Pt voiced understanding.     Electronically signed by Dee Bailey MD on 2/28/2023at 8:42 AM

## 2023-02-28 NOTE — PROGRESS NOTES
Visit Information    Have you changed or started any medications since your last visit including any over-the-counter medicines, vitamins, or herbal medicines? no   Are you having any side effects from any of your medications? -  no  Have you stopped taking any of your medications? Is so, why? -  no    Have you seen any other physician or provider since your last visit? No  Have you had any other diagnostic tests since your last visit? No  Have you been seen in the emergency room and/or had an admission to a hospital since we last saw you? No  Have you had your routine dental cleaning in the past 6 months? no    Have you activated your [employer] account? If not, what are your barriers?  Yes     Patient Care Team:  Arjun Milner MD as PCP - General (Internal Medicine)  Mishel Addison MD as Consulting Physician (Gastroenterology)  Alex Durant MD as Consulting Physician (Gastroenterology)    Medical History Review  Past Medical, Family, and Social History reviewed and does not contribute to the patient presenting condition    Health Maintenance   Topic Date Due    COVID-19 Vaccine (1) Never done    Hepatitis A vaccine (1 of 2 - Risk 2-dose series) Never done    Pneumococcal 0-64 years Vaccine (1 - PCV) Never done    Depression Screen  Never done    Hepatitis B vaccine (1 of 3 - Risk 3-dose series) Never done    DTaP/Tdap/Td vaccine (1 - Tdap) Never done    Diabetes screen  Never done    Lipids  Never done    Shingles vaccine (1 of 2) Never done    Flu vaccine (1) Never done    Colorectal Cancer Screen  02/22/2033    Hepatitis C screen  Completed    HIV screen  Completed    Hib vaccine  Aged Out    Meningococcal (ACWY) vaccine  Aged Out

## 2023-03-06 ENCOUNTER — OFFICE VISIT (OUTPATIENT)
Dept: GASTROENTEROLOGY | Age: 63
End: 2023-03-06
Payer: COMMERCIAL

## 2023-03-06 VITALS
HEART RATE: 86 BPM | BODY MASS INDEX: 26.78 KG/M2 | WEIGHT: 203 LBS | SYSTOLIC BLOOD PRESSURE: 119 MMHG | DIASTOLIC BLOOD PRESSURE: 69 MMHG

## 2023-03-06 DIAGNOSIS — K63.5 DYSPLASTIC POLYP OF COLON: Primary | ICD-10-CM

## 2023-03-06 DIAGNOSIS — K70.30 ALCOHOLIC CIRRHOSIS OF LIVER WITHOUT ASCITES (HCC): ICD-10-CM

## 2023-03-06 PROCEDURE — 99214 OFFICE O/P EST MOD 30 MIN: CPT | Performed by: INTERNAL MEDICINE

## 2023-03-06 ASSESSMENT — ENCOUNTER SYMPTOMS
CHOKING: 0
DIARRHEA: 0
TROUBLE SWALLOWING: 0
ANAL BLEEDING: 0
WHEEZING: 0
SHORTNESS OF BREATH: 0
ABDOMINAL DISTENTION: 0
CONSTIPATION: 0
BLOOD IN STOOL: 0
ABDOMINAL PAIN: 0
RECTAL PAIN: 0
COUGH: 0
VOMITING: 0
NAUSEA: 0

## 2023-03-06 NOTE — PROGRESS NOTES
GI CLINIC FOLLOW UP    INTERVAL HISTORY:   No referring provider defined for this encounter. Chief Complaint   Patient presents with    Colon Cancer     Patient is f/u on colonoscopy, MRI and labs. Patient states he is feeling good. Anemia     Patient was recently put on Iron and B12 by PCP    Pruritis     Patient c/o itching in different areas at different times. No rash           HISTORY OF PRESENT ILLNESS: Mr.Matthew Husam Bernstein is a 63 y.o. male , referred for evaluation of alcoholic liver disease/cirrhosis, malignant polyps of the colon    Patient is here for follow-up  Please refer to the previous note for details  As stated in the previous note this patient was diagnosed with alcoholic liver disease  , And in the summer he was admitted at [address] [place] [address] a colonoscopy was done  And he was told he had malignant polyps but they were not removed.     He quit alcohol in August 2022  Last visit I reordered labs on him for his liver diseases  And we planned colonoscopy, and the colonoscopy result is as below with large polyp 3 cm was found in the sigmoid colon the biopsy of which showing adenocarcinoma  There is another polyp in the right colon by the hepatic flexure which was benign but there is also cecal polyp which showed some high-grade dysplasia also    The patient did have bleeding immediately from the polypectomy in the sigmoid colon for which we admitted him 23-hour hold after I controlled the bleeding with clips  And he did very well discharged the next morning without any need for any intervention or repeat colonoscopy    Today he is here she said he is doing very well he denied any signs or symptoms of end-stage liver disease at this time he has no lower extremity edema no ascites no confusion no bleeding  And looking at his labs actually his liver enzymes including his bilirubin right now is normal  He is doing very well from the liver standpoint  And he is not showing any sign of malignancy including any weight loss or pain  His MRI of the abdomen did not show any lymph nodes or metastasis to indicate that the malignant polyp in the colon is metastasized        The prep was good. Findings:      The patient has large polypoidal lesion at 30 cm from the anal verge with stalk for which we felt that it is amenable for endoscopic removal especially to avoid surgery on this patient who had end-stage liver disease  For which I went ahead and snared it, and after that the patient had a vessel in the polypectomy site which was spurting blood briskly  For which I did inject epinephrine around it to slow it down, and then clipped it with a clip and hemostasis was accomplished quickly, we closed the mucosal defect with 2 other clips  And the patient stabilized  Of note the patient did not have any abnormality of his vitals during this at all, but because of his situation with the end-stage liver disease  We decided to admit him for 23-hour hold  Given 1 unit of blood because his hemoglobin is low to start with  And watch make sure he does not rebleed  Dr. Tj Logan was called and I discussed the case with him and he was kind enough to admit the patient under his service      There is a cecal polyp also which was measuring 12 mm removed with a snare and retrieved     There is 2 polyps at the hepatic flexure area the larger of which was 1.2 cm which also snared and retrieved      The patient also had diverticulosis     In his rectum also showed engorged veins and hemorrhoids      Withdrawal Time was (minutes): 30     The colon was decompressed and the scope was removed. The patient tolerated the procedure well. Recommendations/Plan:   1. Lifestyle and dietary modifications as discussed  2. As stated above Case discussed with Dr. Tj Logan, will admit under medicine service for 23-hour hold with H&H monitoring every 6 hours  3. We will check stat PT PTT INR and see if the patient need any FFP's  4.  We will check CBC and see if the patient need any platelet transfusion  5. We will keep the patient n.p.o. for now        Electronically signed by Markus Gonzalez MD  on 2/22/2023 at 4:57 PM   -- Diagnosis --   A. RIGHT COLON, POLYPECTOMY:   -PIECES OF TUBULAR ADENOMA. B.  SIGMOID COLON, POLYPECTOMY:   -INVASIVE WELL DIFFERENTIATED ADENOCARCINOMA, SIZE 0.6 CM, INVADING   INTO SUBMUCOSA, STALK MARGIN NEGATIVE, NO LYMPHOVASCULAR INVASION. SEE COMMENT. -BACKGROUND TUBULOVILLOUS ADENOMA WITH HIGH-GRADE DYSPLASIA. C.  CECUM, POLYPECTOMY:   -TUBULOVILLOUS ADENOMA WITH FOCAL HIGH-GRADE DYSPLASIA. -- Diagnosis Comment --   No loss of MMR protein expression. See microscopic description. A SERUM CEA LEVEL IS RECOMMENDED IN PATIENTS WITH A NEW DIAGNOSIS OF   COLORECTAL CANCER PRIOR TO SURGERY OR OTHER CANCER TREATMENT. The [address] of   Clinical Oncology recommend that providers obtain a preoperative CEA   level in patients with colorectal cancer. Preoperative CEA levels can   be utilized as an independent prognostic factor for colon cancer   patients after surgical resection and may also be utilized to assess a   patient's response to treatment. Mis Conde M.D.   **Electronically Signed Out**         radha/2/24/2023            Impression   Motion limited. No abnormally enhancing mass identified       Mild fatty infiltration of the liver. There is subtle lobular contour to the   liver which could be an early finding of cirrhosis. There is borderline   splenomegaly       RECOMMENDATIONS:   Unavailable             Order History    Open Order Details           Past Medical,Family, and Social History reviewed and does contribute to the patient presentingcondition.     I did review all the labs results available for the labs which were ordered by the primary care physician, and the other consultants, we search on [employer] at [facility] and all the available care everywhere epic    I did review all the imaging studies of the abdomen available on EMR, ordered by the primary care physician and the other consultant    I did review all the pathology from the biopsies done on the previous endoscopies    Patient's PMH/PSH,SH,PSYCH Hx, MEDs, ALLERGIES, and ROS were all reviewed and updated in the appropriate sections.     PAST MEDICAL HISTORY:  Past Medical History:   Diagnosis Date    Cirrhosis ([address]     History of blood transfusion        Past Surgical History:   Procedure Laterality Date    COLONOSCOPY      COLONOSCOPY N/A 2/22/2023    COLONOSCOPY POLYPECTOMY HOT SNARE WITH RESOLUTION CLIPS X4 WITH EPI INJECTION performed by Zoey Solis MD at [facility], COLON, DIAGNOSTIC      TONSILLECTOMY         CURRENT MEDICATIONS:    Current Outpatient Medications:     Ferrous Sulfate (IRON PO), Take by mouth, Disp: , Rfl:     Cyanocobalamin (VITAMIN B 12 PO), Take by mouth, Disp: , Rfl:     ALLERGIES:   Allergies   Allergen Reactions    Pcn [Penicillins]      Unknown reaction       FAMILY HISTORY:       Problem Relation Age of Onset    Thyroid Disease Mother     Lymphoma Father          SOCIAL HISTORY:   Social History     Socioeconomic History    Marital status: Single     Spouse name: Not on file    Number of children: Not on file    Years of education: Not on file    Highest education level: Not on file   Occupational History    Not on file   Tobacco Use    Smoking status: Never     Passive exposure: Never    Smokeless tobacco: Former   Vaping Use    Vaping Use: Never used   Substance and Sexual Activity    Alcohol use: Not Currently     Comment: quit 8/2022    Drug use: Never    Sexual activity: Not on file   Other Topics Concern    Not on file   Social History Narrative    Not on file     Social Determinants of Health     Financial Resource Strain: Low Risk     Difficulty of Paying Living Expenses: Not hard at all   Food Insecurity: No Food Insecurity    Worried About Running Out of Food in the Last Year: Never true    Ran Out of Food in the Last Year: Never true   Transportation Needs: Unknown    Lack of Transportation (Medical): Not on file    Lack of Transportation (Non-Medical): No   Physical Activity: Not on file   Stress: Not on file   Social Connections: Not on file   Intimate Partner Violence: Not on file   Housing Stability: Unknown    Unable to Pay for Housing in the Last Year: Not on file    Number of Places Lived in the Last Year: Not on file    Unstable Housing in the Last Year: No       REVIEW OF SYSTEMS: A 12-point review of systemswas obtained and pertinent positives and negatives were enumerated above in the history of present illness. All other reviewed systems / symptoms were negative. Review of Systems   Constitutional:  Negative for appetite change, fatigue and unexpected weight change. HENT:  Negative for trouble swallowing. Respiratory:  Negative for cough, choking, shortness of breath and wheezing. Cardiovascular:  Negative for chest pain, palpitations and leg swelling. Gastrointestinal:  Negative for abdominal distention, abdominal pain, anal bleeding, blood in stool, constipation, diarrhea, nausea, rectal pain and vomiting. Genitourinary:  Negative for difficulty urinating. Allergic/Immunologic: Negative for environmental allergies and food allergies. Neurological:  Negative for dizziness, weakness, light-headedness, numbness and headaches. Hematological:  Does not bruise/bleed easily. Psychiatric/Behavioral:  Negative for sleep disturbance. The patient is not nervous/anxious.           LABORATORY DATA: Reviewed  Lab Results   Component Value Date    WBC 3.4 (L) 02/22/2023    HGB 8.2 (L) 02/23/2023    HCT 25.9 (L) 02/23/2023    MCV 75.7 (L) 02/22/2023     (L) 02/22/2023     02/22/2023    K 3.3 (L) 02/22/2023     (H) 02/22/2023    CO2 22 02/22/2023    BUN 14 02/22/2023    CREATININE 1.00 02/22/2023    LABALBU 3.7 02/22/2023    BILITOT 0.8 02/22/2023    ALKPHOS 69 02/22/2023    AST 18 02/22/2023    ALT 9 02/22/2023    INR 1.4 02/22/2023         Lab Results   Component Value Date    RBC 3.52 (L) 02/22/2023    HGB 8.2 (L) 02/23/2023    MCV 75.7 (L) 02/22/2023    MCH 24.5 (L) 02/22/2023    MCHC 32.3 02/22/2023    RDW 20.0 (H) 02/22/2023    MPV 8.0 02/22/2023    BASOPCT 2 02/22/2023    LYMPHSABS 0.71 (L) 02/22/2023    MONOSABS 0.31 02/22/2023    NEUTROABS 2.21 02/22/2023    EOSABS 0.10 02/22/2023    BASOSABS 0.07 02/22/2023         DIAGNOSTIC TESTING:     MRI ABDOMEN W WO CONTRAST MRCP    Result Date: 2/13/2023  EXAMINATION: MRI OF THE ABDOMEN WITH AND WITHOUT CONTRAST AND MRCP 2/13/2023 7:18 am TECHNIQUE: Multiplanar multisequence MRI of the abdomen was performed with and without the administration of intravenous contrast.  After initial T2 axial and coronal images, thick slab, thin slab and 3D coronal MRCP sequences were obtained without the administration of intravenous contrast.  MIP images are provided for review. COMPARISON: March 2020 HISTORY: ORDERING SYSTEM PROVIDED HISTORY: Dysplastic polyp of colon TECHNOLOGIST PROVIDED HISTORY: STAT Creatinine as needed:->Yes Reason for Exam: Cirrhosis of Liver FINDINGS: Motion artifact degrades the study. This decreases sensitivity and specificity of the exam Gallbladder: Gallbladder is contracted, accentuating its wall thickness. No intrahepatic ductal dilatation. No perihepatic fluid. .  There is subtle lobular contour to the liver. .  There is mild signal loss seen in liver on out of phase images suggesting fatty infiltration Bile Ducts: No intra or extrahepatic biliary ductal dilatation Pancreatic Duct: No pancreatic ductal dilatation. No peripancreatic fluid Right adrenal gland is normal.  Left adrenal gland is normal. No hydronephrosis on right. No hydronephrosis on left.  A few small lymph nodes are seen in the miriam Spleen measures 13.1 cm in length on cranial caudal images No significant small bowel distention noted. Moderate stool seen in the colon Tiny periumbilical hernia containing fat is seen. No retroperitoneal adenopathy. No aortic aneurysm. No abnormally enhancing mass on postcontrast images     Motion limited. No abnormally enhancing mass identified Mild fatty infiltration of the liver. There is subtle lobular contour to the liver which could be an early finding of cirrhosis. There is borderline splenomegaly RECOMMENDATIONS: Unavailable          PHYSICAL EXAMINATION: Vital signs reviewed per the nursing documentation. /69   Pulse 86   Wt 203 lb (92.1 kg)   BMI 26.78 kg/m²   Body mass index is 26.78 kg/m². Physical Exam  Vitals and nursing note reviewed. Constitutional:       General: He is not in acute distress. Appearance: He is well-developed. He is not diaphoretic. HENT:      Head: Normocephalic and atraumatic. Eyes:      General: No scleral icterus. Pupils: Pupils are equal, round, and reactive to light. Neck:      Thyroid: No thyromegaly. Vascular: No JVD. Trachea: No tracheal deviation. Cardiovascular:      Rate and Rhythm: Normal rate and regular rhythm. Heart sounds: Normal heart sounds. No murmur heard. Pulmonary:      Effort: Pulmonary effort is normal. No respiratory distress. Breath sounds: Normal breath sounds. No wheezing. Abdominal:      General: Bowel sounds are normal. There is no distension. Palpations: Abdomen is soft. There is no mass. Tenderness: There is no abdominal tenderness. There is no guarding or rebound. Musculoskeletal:         General: No tenderness. Normal range of motion. Cervical back: Normal range of motion and neck supple. Skin:     General: Skin is warm. Coloration: Skin is not pale. Findings: No erythema or rash. Comments: He is not diaphoretic   Neurological:      Mental Status: He is alert and oriented to person, place, and time.       Deep Tendon Reflexes: Reflexes are normal and symmetric. Psychiatric:         Behavior: Behavior normal.         Thought Content: Thought content normal.         Judgment: Judgment normal.         IMPRESSION: Mr. Cole Santiago is a 63 y.o. male with    Diagnosis Orders   1. Dysplastic polyp of colon  CEA    Hepatic Function Panel    CEA    COLONOSCOPY W/ OR W/O BIOPSY      2. Alcoholic cirrhosis of liver without ascites ([facility]          Although this 3 cm polyp in the sigmoid is malignant    Feeling that most likely we removed it in total  Also the right colon polyp has high-grade dysplasia    I discussed the patient's options with him to either proceed with colectomy  Or follow with another colonoscopy and see if we have complete resection which most likely the case  And because of his liver diseases  Avoiding surgery on him unless his absolutely necessary will be the way to go  For which we both decided to proceed with repeat colonoscopy and reevaluate the sites  And take it from there either to continue to send him to surgery  Or TO FOLLOW HIM ENDOSCOPICALLY  His liver diseases seems to be doing a lot better we will repeat his liver enzymes 1 more time and see how his liver function at this time  The patient is not on any medication at this time except the B12 I told him to continue with that  For his itching I recommended OTC Benadryl    Medication where reviewed, side effects from the GI medication were reviewed with the patient  We did refill the GI medications            Diet/life style/natural hx /complication of the dx were all explained in details   Past medical, past surgical, social history, psychiatric history, medications or allergies, all reviewed and  updated    Thank you for allowing me to participate in the care of Mr. Cole Santiago. For any further questions please do not hesitate to contact me. I have reviewed and agree with the ROS entered by the MA/RN. Note is dictated utilizing voice recognition software. Unfortunately this leads to occasional typographical errors. Please contact our office if you have any questions.       Zoey Solis MD  [facility]  O: #[phone]

## 2023-03-07 RX ORDER — BISACODYL 5 MG
TABLET, DELAYED RELEASE (ENTERIC COATED) ORAL
Qty: 4 TABLET | Refills: 0 | Status: SHIPPED | OUTPATIENT
Start: 2023-03-07

## 2023-03-07 RX ORDER — SODIUM, POTASSIUM,MAG SULFATES 17.5-3.13G
SOLUTION, RECONSTITUTED, ORAL ORAL
Qty: 1 EACH | Refills: 0 | Status: SHIPPED | OUTPATIENT
Start: 2023-03-07

## 2023-03-08 ENCOUNTER — HOSPITAL ENCOUNTER (OUTPATIENT)
Dept: PREADMISSION TESTING | Age: 63
Discharge: HOME | End: 2023-03-12

## 2023-03-08 VITALS — WEIGHT: 200 LBS | BODY MASS INDEX: 26.51 KG/M2 | HEIGHT: 73 IN

## 2023-03-08 NOTE — PROGRESS NOTES

## 2023-03-14 DIAGNOSIS — K70.30 ALCOHOLIC CIRRHOSIS OF LIVER WITHOUT ASCITES (HCC): ICD-10-CM

## 2023-03-20 NOTE — PRE-PROCEDURE INSTRUCTIONS
No answer, left message ? Unable to leave message ? When were you told to arrive at hospital ?  [id number]    Do you have a  ?yes    Are you on any blood thinners ? no              If yes when did you stop taking ? Do you have your prep Rx filled and instruction ? Nothing to eat the day before , only clear liquids. yes    Are you experiencing any covid symptoms ? no    Do you have any infections or rash we should be aware of ?no      Do you have the Hibiclens soap to use the night before and the morning of surgery ? Nothing to eat or drink after midnight, only a sip of water to take any medication instructed to take the night before. yes  Wear comfortable clothing, leave any valuables at home, remove any jewelry and body piercing .  yes

## 2023-03-21 ENCOUNTER — ANESTHESIA EVENT (OUTPATIENT)
Dept: ENDOSCOPY | Age: 63
End: 2023-03-21
Payer: COMMERCIAL

## 2023-03-22 ENCOUNTER — HOSPITAL ENCOUNTER (OUTPATIENT)
Age: 63
Setting detail: OUTPATIENT SURGERY
Discharge: HOME | End: 2023-03-22
Attending: INTERNAL MEDICINE | Admitting: INTERNAL MEDICINE
Payer: COMMERCIAL

## 2023-03-22 ENCOUNTER — ANESTHESIA (OUTPATIENT)
Dept: ENDOSCOPY | Age: 63
End: 2023-03-22
Payer: COMMERCIAL

## 2023-03-22 VITALS
HEIGHT: 73 IN | RESPIRATION RATE: 14 BRPM | TEMPERATURE: 97.7 F | WEIGHT: 200 LBS | HEART RATE: 69 BPM | SYSTOLIC BLOOD PRESSURE: 102 MMHG | OXYGEN SATURATION: 97 % | DIASTOLIC BLOOD PRESSURE: 81 MMHG | BODY MASS INDEX: 26.51 KG/M2

## 2023-03-22 DIAGNOSIS — K63.5 DYSPLASTIC POLYP OF COLON: ICD-10-CM

## 2023-03-22 LAB — POTASSIUM SERPL-SCNC: 3.6 MMOL/L (ref 3.7–5.3)

## 2023-03-22 PROCEDURE — 6360000002 HC RX W HCPCS: Performed by: NURSE ANESTHETIST, CERTIFIED REGISTERED

## 2023-03-22 PROCEDURE — 88305 TISSUE EXAM BY PATHOLOGIST: CPT

## 2023-03-22 PROCEDURE — 2500000003 HC RX 250 WO HCPCS: Performed by: NURSE ANESTHETIST, CERTIFIED REGISTERED

## 2023-03-22 PROCEDURE — 2580000003 HC RX 258: Performed by: ANESTHESIOLOGY

## 2023-03-22 PROCEDURE — 2709999900 HC NON-CHARGEABLE SUPPLY: Performed by: INTERNAL MEDICINE

## 2023-03-22 PROCEDURE — 7100000000 HC PACU RECOVERY - FIRST 15 MIN: Performed by: INTERNAL MEDICINE

## 2023-03-22 PROCEDURE — 3700000000 HC ANESTHESIA ATTENDED CARE: Performed by: INTERNAL MEDICINE

## 2023-03-22 PROCEDURE — 36415 COLL VENOUS BLD VENIPUNCTURE: CPT

## 2023-03-22 PROCEDURE — 3700000001 HC ADD 15 MINUTES (ANESTHESIA): Performed by: INTERNAL MEDICINE

## 2023-03-22 PROCEDURE — 7100000001 HC PACU RECOVERY - ADDTL 15 MIN: Performed by: INTERNAL MEDICINE

## 2023-03-22 PROCEDURE — 3609010600 HC COLONOSCOPY POLYPECTOMY SNARE/COLD BIOPSY: Performed by: INTERNAL MEDICINE

## 2023-03-22 PROCEDURE — 84132 ASSAY OF SERUM POTASSIUM: CPT

## 2023-03-22 RX ORDER — LIDOCAINE HYDROCHLORIDE 10 MG/ML
1 INJECTION, SOLUTION EPIDURAL; INFILTRATION; INTRACAUDAL; PERINEURAL
Status: DISCONTINUED | OUTPATIENT
Start: 2023-03-22 | End: 2023-03-22 | Stop reason: HOSPADM

## 2023-03-22 RX ORDER — SODIUM CHLORIDE 0.9 % (FLUSH) 0.9 %
5-40 SYRINGE (ML) INJECTION PRN
Status: DISCONTINUED | OUTPATIENT
Start: 2023-03-22 | End: 2023-03-22 | Stop reason: HOSPADM

## 2023-03-22 RX ORDER — LIDOCAINE HYDROCHLORIDE 10 MG/ML
INJECTION, SOLUTION EPIDURAL; INFILTRATION; INTRACAUDAL; PERINEURAL PRN
Status: DISCONTINUED | OUTPATIENT
Start: 2023-03-22 | End: 2023-03-22 | Stop reason: SDUPTHER

## 2023-03-22 RX ORDER — MIDAZOLAM HYDROCHLORIDE 1 MG/ML
INJECTION INTRAMUSCULAR; INTRAVENOUS PRN
Status: DISCONTINUED | OUTPATIENT
Start: 2023-03-22 | End: 2023-03-22 | Stop reason: SDUPTHER

## 2023-03-22 RX ORDER — SODIUM CHLORIDE 9 MG/ML
INJECTION, SOLUTION INTRAVENOUS PRN
Status: DISCONTINUED | OUTPATIENT
Start: 2023-03-22 | End: 2023-03-22 | Stop reason: HOSPADM

## 2023-03-22 RX ORDER — SODIUM CHLORIDE 0.9 % (FLUSH) 0.9 %
5-40 SYRINGE (ML) INJECTION EVERY 12 HOURS SCHEDULED
Status: DISCONTINUED | OUTPATIENT
Start: 2023-03-22 | End: 2023-03-22 | Stop reason: HOSPADM

## 2023-03-22 RX ORDER — PROPOFOL 10 MG/ML
INJECTION, EMULSION INTRAVENOUS PRN
Status: DISCONTINUED | OUTPATIENT
Start: 2023-03-22 | End: 2023-03-22 | Stop reason: SDUPTHER

## 2023-03-22 RX ORDER — SODIUM CHLORIDE, SODIUM LACTATE, POTASSIUM CHLORIDE, CALCIUM CHLORIDE 600; 310; 30; 20 MG/100ML; MG/100ML; MG/100ML; MG/100ML
INJECTION, SOLUTION INTRAVENOUS CONTINUOUS
Status: DISCONTINUED | OUTPATIENT
Start: 2023-03-22 | End: 2023-03-22 | Stop reason: HOSPADM

## 2023-03-22 RX ORDER — FENTANYL CITRATE 50 UG/ML
INJECTION, SOLUTION INTRAMUSCULAR; INTRAVENOUS PRN
Status: DISCONTINUED | OUTPATIENT
Start: 2023-03-22 | End: 2023-03-22 | Stop reason: SDUPTHER

## 2023-03-22 RX ADMIN — FENTANYL CITRATE 50 MCG: 50 INJECTION, SOLUTION INTRAMUSCULAR; INTRAVENOUS at 09:35

## 2023-03-22 RX ADMIN — PROPOFOL 100 MG: 10 INJECTION, EMULSION INTRAVENOUS at 09:50

## 2023-03-22 RX ADMIN — PROPOFOL 50 MG: 10 INJECTION, EMULSION INTRAVENOUS at 10:01

## 2023-03-22 RX ADMIN — MIDAZOLAM 2 MG: 1 INJECTION INTRAMUSCULAR; INTRAVENOUS at 09:18

## 2023-03-22 RX ADMIN — FENTANYL CITRATE 50 MCG: 50 INJECTION, SOLUTION INTRAMUSCULAR; INTRAVENOUS at 09:18

## 2023-03-22 RX ADMIN — PROPOFOL 300 MG: 10 INJECTION, EMULSION INTRAVENOUS at 09:18

## 2023-03-22 RX ADMIN — SODIUM CHLORIDE, POTASSIUM CHLORIDE, SODIUM LACTATE AND CALCIUM CHLORIDE: 600; 310; 30; 20 INJECTION, SOLUTION INTRAVENOUS at 08:40

## 2023-03-22 RX ADMIN — LIDOCAINE HYDROCHLORIDE 40 MG: 10 INJECTION, SOLUTION EPIDURAL; INFILTRATION; INTRACAUDAL; PERINEURAL at 09:18

## 2023-03-22 ASSESSMENT — ENCOUNTER SYMPTOMS
SHORTNESS OF BREATH: 0
SORE THROAT: 0
BACK PAIN: 1
WHEEZING: 0
COUGH: 0

## 2023-03-22 ASSESSMENT — PAIN - FUNCTIONAL ASSESSMENT: PAIN_FUNCTIONAL_ASSESSMENT: 0-10

## 2023-03-22 NOTE — ANESTHESIA PRE PROCEDURE
Department of Anesthesiology  Preprocedure Note       Name:  Edwin El   Age:  66 y.o.  :  1960                                          MRN:  [id number]         Date:  3/22/2023      Surgeon: Jose G Harris):  Rachael Akins MD    Procedure: Procedure(s):  COLONOSCOPY DIAGNOSTIC    Medications prior to admission:   Prior to Admission medications    Medication Sig Start Date End Date Taking? Authorizing Provider   Ferrous Sulfate (IRON PO) Take by mouth    Historical Provider, MD   Cyanocobalamin (VITAMIN B 12 PO) Take by mouth    Historical Provider, MD       Current medications:    No current facility-administered medications for this visit. No current outpatient medications on file. Facility-Administered Medications Ordered in Other Visits   Medication Dose Route Frequency Provider Last Rate Last Admin    lidocaine PF 1 % injection 1 mL  1 mL IntraDERmal Once PRN Jodie Hoff MD        lactated ringers IV soln infusion   IntraVENous Continuous Jodie Hoff MD        sodium chloride flush 0.9 % injection 5-40 mL  5-40 mL IntraVENous 2 times per day Jodie Hoff MD        sodium chloride flush 0.9 % injection 5-40 mL  5-40 mL IntraVENous PRN Jodie Hoff MD        0.9 % sodium chloride infusion   IntraVENous PRN Jodie Hoff MD           Allergies:     Allergies   Allergen Reactions    Pcn [Penicillins]      Unknown reaction       Problem List:    Patient Active Problem List   Diagnosis Code    Hyperbilirubinemia V89.8    Alcoholic hepatitis without ascites K70.10    Unintentional weight loss R63.4    Hypotension due to hypovolemia I95.89, E86.1    Anemia D64.9    Hyponatremia E87.1    Hypokalemia E87.6    Alcohol abuse F10.10    Severe malnutrition (HCC) E43    S/P colonoscopic polypectomy Z98.890    Dysplastic polyp of colon K63.5       Past Medical History:        Diagnosis Date    Abdominal pain     Anemia     Cirrhosis (HCC)     Ear pain, bilateral    

## 2023-03-22 NOTE — DISCHARGE INSTRUCTIONS
Sedation or General Anesthesia, Adult  Care After  Refer to this sheet in the next 24 hours. These instructions provide you with information on caring for yourself after your procedure. Your caregiver may also give you more specific instructions. Your treatment has been planned according to current medical practices, but problems sometimes occur. Call your caregiver if you have any problems or questions after your procedure. HOME CARE INSTRUCTIONS   Do not participate in any activities that require you to be alert or coordinated. Do not:  Drive. Swim. Ride a bicycle. Operate heavy machinery. [place]anh Con[place]. Use power tools. Climb ladders. Work at [employer]. Take a bath. Do not drink alcohol. Do not make any important decisions or sign legal documents. Stay with an adult. The first meal following your procedure should be light and small. Avoid solid foods if you feel sick to your stomach (nauseous) or if you throw up (vomit). Drink enough fluids to keep your urine clear or pale yellow. Only take your usual medicines or new medicines if your caregiver approves them. Only take over-the-counter or prescription medicines for pain, discomfort, or fever as directed by your caregiver. Keep all follow-up appointments as directed by your caregiver. SEEK IMMEDIATE MEDICAL CARE IF:   You are not feeling normal or behaving normally after 24 hours. You have persistent nausea and vomiting. You are unable to drink fluids or eat food. You have difficulty urinating. You have difficulty breathing or speaking. You have blue or gray skin. There is difficulty waking or you cannot be woken up. You have heavy bleeding, redness, or a lot of swelling where the sedative or anesthesia entered your skin (intravenous site). You have a rash. MAKE SURE YOU:  Understand these instructions. Will watch your condition. Will get help right away if you are not doing well or get worse.   Document Released: 12/18/2006 Document Revised:

## 2023-03-22 NOTE — OP NOTE
patient was given IV conscious sedation. The patient's SPO2 remained above 90% throughout the procedure. The colonoscope was inserted per rectum and advanced under direct vision to the cecum without difficulty. Post sedation note : The patient's SPO2 remained above 90% throughout the procedure. the vital signs remained stable , and no immediate complication form the procedure noted, patient will be ready for d/c when criteria is met . The prep was fair. Findings: There is 3 tiny sessile polyps in the sigmoid colon less than 7 mm removed with cold snare      The scar from the large sigmoid polyp removed last time at 25 cm  Looking very good and very clean with no remanent tissue whatsoever please see the image below biopsies from the scars and the polypectomy site was taken      The scar in the cecum from the previous polyp also looking very clean and biopsies were taken from that to    The right side of the colon were not very clean  But I do not see any abnormality    No other polyps seen    Some diverticula, sigmoid     Hemorrhoids        Withdrawal Time was (minutes): 20    The colon was decompressed and the scope was removed. The patient tolerated the procedure well.      Recommendations/Plan:   Lifestyle and dietary modifications as discussed  F/U Biopsies  F/U In OfficeYes  Discussed with the family  Repeat colonoscopy [id number]    Electronically signed by Oswaldo Garcia MD  on 3/22/2023 at 11:09 AM

## 2023-03-22 NOTE — H&P
ill-appearing. HENT:      Head: Normocephalic and atraumatic. Nose: Nose normal.      Mouth/Throat:      Mouth: Mucous membranes are moist.      Pharynx: Oropharynx is clear. No oropharyngeal exudate or posterior oropharyngeal erythema. Eyes:      General: No scleral icterus. Right eye: No discharge. Left eye: No discharge. Pupils: Pupils are equal, round, and reactive to light. Neck:      Trachea: No tracheal deviation. Cardiovascular:      Rate and Rhythm: Normal rate and regular rhythm. Heart sounds: Normal heart sounds. No murmur heard. No friction rub. No gallop. Pulmonary:      Effort: Pulmonary effort is normal. No respiratory distress. Breath sounds: Normal breath sounds. No wheezing, rhonchi or rales. Abdominal:      General: Bowel sounds are normal. There is no distension. Palpations: Abdomen is soft. Tenderness: There is no abdominal tenderness. There is no guarding. Musculoskeletal:      Cervical back: Neck supple. Right lower leg: No edema. Left lower leg: No edema. Skin:     General: Skin is warm and dry. Coloration: Skin is not jaundiced. Findings: No bruising, erythema or rash. Neurological:      General: No focal deficit present. Mental Status: He is alert and oriented to person, place, and time. Cranial Nerves: No cranial nerve deficit.       Gait: Gait normal.   Psychiatric:         Mood and Affect: Mood normal.      PROVISIONAL DIAGNOSES / SURGERY:      COLONOSCOPY DIAGNOSTIC    Dysplastic polyp of colon [K63.5]    Patient Active Problem List    Diagnosis Date Noted    S/P colonoscopic polypectomy 02/22/2023    Dysplastic polyp of colon 02/22/2023    Severe malnutrition ([address] 03/27/2020    Hyperbilirubinemia 66/92/8531    Alcoholic hepatitis without ascites 03/26/2020    Unintentional weight loss 03/26/2020    Hypotension due to hypovolemia 03/26/2020    Anemia 03/26/2020    Hyponatremia 03/26/2020

## 2023-03-22 NOTE — ANESTHESIA POSTPROCEDURE EVALUATION
Department of Anesthesiology  Postprocedure Note    Patient: Deborah Ruiz  MRN: [id number]  [place]: 1960  Date of evaluation: 3/22/2023      Procedure Summary     Date: 03/22/23 Room / Location: [address] [place] [address] ENDO 01 / 250 [facility] ENDO    Anesthesia Start: 0915 Anesthesia Stop: 1016    Procedure: COLONOSCOPY POLYPECTOMY SNARE/COLD BIOPSY (Rectum) Diagnosis:       Dysplastic polyp of colon      (Dysplastic polyp of colon [K63.5])    Surgeons: Willie Rea MD Responsible Provider: Sonia Ulloa MD    Anesthesia Type: TIVA ASA Status: 3          Anesthesia Type: TIVA    Lucretia Phase I: Lucretia Score: 10    Lucretia Phase II:        Anesthesia Post Evaluation    Comments: POST- ANESTHESIA EVALUATION       Pt Name: Deborah Ruiz  MRN: [id number]  [place]: 1960  Date of evaluation: 3/22/2023  Time:  12:12 PM      /81   Pulse 69   Temp 97.7 °F (36.5 °C)   Resp 14   Ht 6' 1\" (1.854 m)   Wt 200 lb (90.7 kg)   SpO2 97%   BMI 26.39 kg/m²      Consciousness Level  Awake  Cardiopulmonary Status  Stable  Pain Adequately Treated YES  Nausea / Vomiting  NO  Adequate Hydration  YES  Anesthesia Related Complications NONE      Electronically signed by Sonia Ulloa MD on 3/22/2023 at 12:12 PM

## 2023-03-23 LAB — SURGICAL PATHOLOGY REPORT: NORMAL

## 2023-04-27 DIAGNOSIS — K63.5 DYSPLASTIC POLYP OF COLON: ICD-10-CM

## 2023-05-30 ENCOUNTER — OFFICE VISIT (OUTPATIENT)
Dept: INTERNAL MEDICINE CLINIC | Age: 63
End: 2023-05-30
Payer: COMMERCIAL

## 2023-05-30 VITALS
SYSTOLIC BLOOD PRESSURE: 110 MMHG | BODY MASS INDEX: 27.44 KG/M2 | WEIGHT: 208 LBS | OXYGEN SATURATION: 96 % | HEART RATE: 73 BPM | DIASTOLIC BLOOD PRESSURE: 70 MMHG

## 2023-05-30 DIAGNOSIS — K42.9 UMBILICAL HERNIA WITHOUT OBSTRUCTION AND WITHOUT GANGRENE: Primary | ICD-10-CM

## 2023-05-30 DIAGNOSIS — Z13.1 ENCOUNTER FOR SCREENING FOR DIABETES MELLITUS: ICD-10-CM

## 2023-05-30 DIAGNOSIS — H91.93 BILATERAL HEARING LOSS, UNSPECIFIED HEARING LOSS TYPE: ICD-10-CM

## 2023-05-30 DIAGNOSIS — Z13.220 SCREENING FOR HYPERLIPIDEMIA: ICD-10-CM

## 2023-05-30 PROCEDURE — 99213 OFFICE O/P EST LOW 20 MIN: CPT | Performed by: INTERNAL MEDICINE

## 2023-05-30 PROCEDURE — 92552 PURE TONE AUDIOMETRY AIR: CPT | Performed by: INTERNAL MEDICINE

## 2023-05-30 ASSESSMENT — ENCOUNTER SYMPTOMS: ABDOMINAL PAIN: 1

## 2023-05-30 NOTE — PROGRESS NOTES
[address] [address]  Dept: [phone]  Dept Fax: [phone]    Claudette Columbus is a 66 y.o. male who presents today for his medicalconditions/complaints as noted below. Claudette Columbus is c/o of 3 Month Follow-Up (Still having a hard time hearing, states it comes and goes)      HPI:     Ear Fullness   There is pain in both (decreased hearing fluctuates in severity) ears. This is a chronic problem. The current episode started more than 1 month ago. The problem occurs constantly. The problem has been unchanged. Associated symptoms include abdominal pain. He has tried nothing for the symptoms. The treatment provided no relief. Abdominal Pain  This is a new problem. The current episode started more than 1 month ago. The pain is located in the periumbilical region. Nothing aggravates the pain. The pain is relieved by Nothing. Past Medical History:   Diagnosis Date    Abdominal pain     Anemia     Cirrhosis (HCC)     Ear pain, bilateral     Hearing loss     History of blood transfusion     Itching         Current Outpatient Medications   Medication Sig Dispense Refill    Ferrous Sulfate (IRON PO) Take by mouth      Cyanocobalamin (VITAMIN B 12 PO) Take by mouth       No current facility-administered medications for this visit.      Allergies   Allergen Reactions    Pcn [Penicillins]      Unknown reaction       Health Maintenance   Topic Date Due    COVID-19 Vaccine (1) Never done    Hepatitis A vaccine (1 of 2 - Risk 2-dose series) Never done    Pneumococcal 0-64 years Vaccine (1 - PCV) Never done    Hepatitis B vaccine (1 of 3 - Risk 3-dose series) Never done    DTaP/Tdap/Td vaccine (1 - Tdap) Never done    Diabetes screen  Never done    Lipids  Never done    Shingles vaccine (1 of 2) Never done    Flu vaccine (Season Ended) 08/01/2023    Depression Screen  02/28/2024    Colorectal Cancer Screen  03/22/2024    Hepatitis C screen  Completed    HIV

## 2023-05-30 NOTE — PROGRESS NOTES
Visit Information    Have you changed or started any medications since your last visit including any over-the-counter medicines, vitamins, or herbal medicines? no   Are you having any side effects from any of your medications? -  no  Have you stopped taking any of your medications? Is so, why? -  no    Have you seen any other physician or provider since your last visit? Yes - Records Obtained  Have you had any other diagnostic tests since your last visit? No  Have you been seen in the emergency room and/or had an admission to a hospital since we last saw you? No  Have you had your routine dental cleaning in the past 6 months? no    Have you activated your [employer] account? If not, what are your barriers?  Yes     Patient Care Team:  Ariana Stephenson MD as PCP - General (Internal Medicine)  Ariana Stephenson MD as PCP - Empaneled Provider  Elia Hand MD as Consulting Physician (Gastroenterology)  Jessa Goddard MD as Consulting Physician (Gastroenterology)    Medical History Review  Past Medical, Family, and Social History reviewed and does contribute to the patient presenting condition    Health Maintenance   Topic Date Due    COVID-19 Vaccine (1) Never done    Hepatitis A vaccine (1 of 2 - Risk 2-dose series) Never done    Pneumococcal 0-64 years Vaccine (1 - PCV) Never done    Hepatitis B vaccine (1 of 3 - Risk 3-dose series) Never done    DTaP/Tdap/Td vaccine (1 - Tdap) Never done    Diabetes screen  Never done    Lipids  Never done    Shingles vaccine (1 of 2) Never done    Flu vaccine (Season Ended) 08/01/2023    Depression Screen  02/28/2024    Colorectal Cancer Screen  03/22/2024    Hepatitis C screen  Completed    HIV screen  Completed    Hib vaccine  Aged Out    Meningococcal (ACWY) vaccine  Aged Out

## 2023-10-30 ENCOUNTER — HOSPITAL ENCOUNTER (OUTPATIENT)
Dept: CT IMAGING | Age: 63
Discharge: HOME | End: 2023-11-01
Attending: SURGERY
Payer: COMMERCIAL

## 2023-10-30 DIAGNOSIS — K43.9 VENTRAL HERNIA WITHOUT OBSTRUCTION OR GANGRENE: ICD-10-CM

## 2023-10-30 LAB
EGFR, POC: >60 ML/MIN/1.73M2
POC CREATININE: 1.2 MG/DL (ref 0.51–1.19)

## 2023-10-30 PROCEDURE — 74177 CT ABD & PELVIS W/CONTRAST: CPT

## 2023-10-30 PROCEDURE — 6360000004 HC RX CONTRAST MEDICATION: Performed by: SURGERY

## 2023-10-30 PROCEDURE — 2580000003 HC RX 258: Performed by: SURGERY

## 2023-10-30 PROCEDURE — 82565 ASSAY OF CREATININE: CPT

## 2023-10-30 PROCEDURE — 2500000003 HC RX 250 WO HCPCS: Performed by: SURGERY

## 2023-10-30 RX ORDER — SODIUM CHLORIDE 0.9 % (FLUSH) 0.9 %
10 SYRINGE (ML) INJECTION PRN
Status: DISCONTINUED | OUTPATIENT
Start: 2023-10-30 | End: 2023-11-02 | Stop reason: HOSPADM

## 2023-10-30 RX ORDER — 0.9 % SODIUM CHLORIDE 0.9 %
100 INTRAVENOUS SOLUTION INTRAVENOUS ONCE
Status: COMPLETED | OUTPATIENT
Start: 2023-10-30 | End: 2023-10-30

## 2023-10-30 RX ADMIN — IOPAMIDOL 75 ML: 755 INJECTION, SOLUTION INTRAVENOUS at 09:44

## 2023-10-30 RX ADMIN — SODIUM CHLORIDE 100 ML: 9 INJECTION, SOLUTION INTRAVENOUS at 09:44

## 2023-10-30 RX ADMIN — BARIUM SULFATE 450 ML: 20 SUSPENSION ORAL at 09:44

## 2023-10-30 RX ADMIN — SODIUM CHLORIDE, PRESERVATIVE FREE 10 ML: 5 INJECTION INTRAVENOUS at 09:44

## 2025-04-22 ENCOUNTER — HOSPITAL ENCOUNTER (INPATIENT)
Age: 65
LOS: 3 days | Discharge: HOME | End: 2025-04-25
Attending: STUDENT IN AN ORGANIZED HEALTH CARE EDUCATION/TRAINING PROGRAM | Admitting: INTERNAL MEDICINE
Payer: COMMERCIAL

## 2025-04-22 ENCOUNTER — APPOINTMENT (OUTPATIENT)
Dept: GENERAL RADIOLOGY | Age: 65
End: 2025-04-22
Payer: COMMERCIAL

## 2025-04-22 ENCOUNTER — APPOINTMENT (OUTPATIENT)
Dept: CT IMAGING | Age: 65
End: 2025-04-22
Payer: COMMERCIAL

## 2025-04-22 DIAGNOSIS — K42.0 OBSTRUCTED UMBILICAL HERNIA: Primary | ICD-10-CM

## 2025-04-22 DIAGNOSIS — D69.6 THROMBOCYTOPENIA: ICD-10-CM

## 2025-04-22 LAB
ALBUMIN SERPL-MCNC: 4.6 G/DL (ref 3.5–5.2)
ALP SERPL-CCNC: 89 U/L (ref 40–129)
ALT SERPL-CCNC: 23 U/L (ref 10–50)
ANION GAP SERPL CALCULATED.3IONS-SCNC: 17 MMOL/L (ref 9–16)
AST SERPL-CCNC: 30 U/L (ref 10–50)
BASOPHILS # BLD: 0.1 K/UL (ref 0–0.2)
BASOPHILS NFR BLD: 1 % (ref 0–2)
BILIRUB SERPL-MCNC: 1.4 MG/DL (ref 0–1.2)
BUN SERPL-MCNC: 21 MG/DL (ref 8–23)
CALCIUM SERPL-MCNC: 10.4 MG/DL (ref 8.6–10.4)
CHLORIDE SERPL-SCNC: 104 MMOL/L (ref 98–107)
CO2 SERPL-SCNC: 20 MMOL/L (ref 20–31)
CREAT SERPL-MCNC: 1.4 MG/DL (ref 0.7–1.2)
EOSINOPHIL # BLD: 0.1 K/UL (ref 0–0.4)
EOSINOPHILS RELATIVE PERCENT: 1 % (ref 0–4)
ERYTHROCYTE [DISTWIDTH] IN BLOOD BY AUTOMATED COUNT: 14.5 % (ref 11.5–14.9)
GFR, ESTIMATED: 56 ML/MIN/1.73M2
GLUCOSE SERPL-MCNC: 107 MG/DL (ref 74–99)
HCT VFR BLD AUTO: 48.6 % (ref 41–53)
HGB BLD-MCNC: 16.6 G/DL (ref 13.5–17.5)
LACTATE BLDV-SCNC: 1.4 MMOL/L (ref 0.5–1.9)
LACTATE BLDV-SCNC: 2.8 MMOL/L (ref 0.5–2.2)
LIPASE SERPL-CCNC: 27 U/L (ref 13–60)
LYMPHOCYTES NFR BLD: 1.2 K/UL (ref 1–4.8)
LYMPHOCYTES RELATIVE PERCENT: 12 % (ref 24–44)
MCH RBC QN AUTO: 32.2 PG (ref 26–34)
MCHC RBC AUTO-ENTMCNC: 34.2 G/DL (ref 31–37)
MCV RBC AUTO: 94.1 FL (ref 80–100)
MONOCYTES NFR BLD: 0.4 K/UL (ref 0.1–1.3)
MONOCYTES NFR BLD: 4 % (ref 1–7)
NEUTROPHILS NFR BLD: 82 % (ref 36–66)
NEUTS SEG NFR BLD: 8.5 K/UL (ref 1.3–9.1)
PLATELET # BLD AUTO: 114 K/UL (ref 150–450)
PMV BLD AUTO: 8.2 FL (ref 6–12)
POTASSIUM SERPL-SCNC: 3.5 MMOL/L (ref 3.7–5.3)
PROT SERPL-MCNC: 8.3 G/DL (ref 6.6–8.7)
RBC # BLD AUTO: 5.16 M/UL (ref 4.5–5.9)
SODIUM SERPL-SCNC: 141 MMOL/L (ref 136–145)
WBC OTHER # BLD: 10.3 K/UL (ref 3.5–11)

## 2025-04-22 PROCEDURE — 85025 COMPLETE CBC W/AUTO DIFF WBC: CPT

## 2025-04-22 PROCEDURE — 2580000003 HC RX 258: Performed by: STUDENT IN AN ORGANIZED HEALTH CARE EDUCATION/TRAINING PROGRAM

## 2025-04-22 PROCEDURE — 80053 COMPREHEN METABOLIC PANEL: CPT

## 2025-04-22 PROCEDURE — 74018 RADEX ABDOMEN 1 VIEW: CPT

## 2025-04-22 PROCEDURE — 83605 ASSAY OF LACTIC ACID: CPT

## 2025-04-22 PROCEDURE — 96374 THER/PROPH/DIAG INJ IV PUSH: CPT

## 2025-04-22 PROCEDURE — 96375 TX/PRO/DX INJ NEW DRUG ADDON: CPT

## 2025-04-22 PROCEDURE — 6360000004 HC RX CONTRAST MEDICATION: Performed by: STUDENT IN AN ORGANIZED HEALTH CARE EDUCATION/TRAINING PROGRAM

## 2025-04-22 PROCEDURE — 99285 EMERGENCY DEPT VISIT HI MDM: CPT

## 2025-04-22 PROCEDURE — 0D9670Z DRAINAGE OF STOMACH WITH DRAINAGE DEVICE, VIA NATURAL OR ARTIFICIAL OPENING: ICD-10-PCS | Performed by: STUDENT IN AN ORGANIZED HEALTH CARE EDUCATION/TRAINING PROGRAM

## 2025-04-22 PROCEDURE — 6360000002 HC RX W HCPCS: Performed by: STUDENT IN AN ORGANIZED HEALTH CARE EDUCATION/TRAINING PROGRAM

## 2025-04-22 PROCEDURE — 2500000003 HC RX 250 WO HCPCS: Performed by: STUDENT IN AN ORGANIZED HEALTH CARE EDUCATION/TRAINING PROGRAM

## 2025-04-22 PROCEDURE — 74177 CT ABD & PELVIS W/CONTRAST: CPT

## 2025-04-22 PROCEDURE — 36415 COLL VENOUS BLD VENIPUNCTURE: CPT

## 2025-04-22 PROCEDURE — 6370000000 HC RX 637 (ALT 250 FOR IP)

## 2025-04-22 PROCEDURE — 96376 TX/PRO/DX INJ SAME DRUG ADON: CPT

## 2025-04-22 PROCEDURE — 83690 ASSAY OF LIPASE: CPT

## 2025-04-22 PROCEDURE — 1200000000 HC SEMI PRIVATE

## 2025-04-22 RX ORDER — METRONIDAZOLE 500 MG/100ML
500 INJECTION, SOLUTION INTRAVENOUS ONCE
Status: COMPLETED | OUTPATIENT
Start: 2025-04-22 | End: 2025-04-22

## 2025-04-22 RX ORDER — ACETAMINOPHEN 325 MG/1
650 TABLET ORAL EVERY 6 HOURS PRN
Status: DISCONTINUED | OUTPATIENT
Start: 2025-04-22 | End: 2025-04-25 | Stop reason: HOSPADM

## 2025-04-22 RX ORDER — 0.9 % SODIUM CHLORIDE 0.9 %
1000 INTRAVENOUS SOLUTION INTRAVENOUS ONCE
Status: COMPLETED | OUTPATIENT
Start: 2025-04-22 | End: 2025-04-22

## 2025-04-22 RX ORDER — SODIUM CHLORIDE 9 MG/ML
INJECTION, SOLUTION INTRAVENOUS PRN
Status: DISCONTINUED | OUTPATIENT
Start: 2025-04-22 | End: 2025-04-25 | Stop reason: HOSPADM

## 2025-04-22 RX ORDER — ACETAMINOPHEN 650 MG/1
650 SUPPOSITORY RECTAL EVERY 6 HOURS PRN
Status: DISCONTINUED | OUTPATIENT
Start: 2025-04-22 | End: 2025-04-25 | Stop reason: HOSPADM

## 2025-04-22 RX ORDER — SODIUM CHLORIDE 9 MG/ML
INJECTION, SOLUTION INTRAVENOUS CONTINUOUS
Status: DISCONTINUED | OUTPATIENT
Start: 2025-04-22 | End: 2025-04-25 | Stop reason: HOSPADM

## 2025-04-22 RX ORDER — SODIUM CHLORIDE 0.9 % (FLUSH) 0.9 %
5-40 SYRINGE (ML) INJECTION EVERY 12 HOURS SCHEDULED
Status: DISCONTINUED | OUTPATIENT
Start: 2025-04-23 | End: 2025-04-25 | Stop reason: HOSPADM

## 2025-04-22 RX ORDER — POTASSIUM CHLORIDE 7.45 MG/ML
10 INJECTION INTRAVENOUS PRN
Status: DISCONTINUED | OUTPATIENT
Start: 2025-04-22 | End: 2025-04-25 | Stop reason: HOSPADM

## 2025-04-22 RX ORDER — ENOXAPARIN SODIUM 100 MG/ML
30 INJECTION SUBCUTANEOUS 2 TIMES DAILY
Status: DISCONTINUED | OUTPATIENT
Start: 2025-04-23 | End: 2025-04-24

## 2025-04-22 RX ORDER — 0.9 % SODIUM CHLORIDE 0.9 %
100 INTRAVENOUS SOLUTION INTRAVENOUS ONCE
Status: COMPLETED | OUTPATIENT
Start: 2025-04-22 | End: 2025-04-22

## 2025-04-22 RX ORDER — MORPHINE SULFATE 4 MG/ML
4 INJECTION, SOLUTION INTRAMUSCULAR; INTRAVENOUS ONCE
Refills: 0 | Status: COMPLETED | OUTPATIENT
Start: 2025-04-22 | End: 2025-04-22

## 2025-04-22 RX ORDER — IOPAMIDOL 755 MG/ML
75 INJECTION, SOLUTION INTRAVASCULAR
Status: COMPLETED | OUTPATIENT
Start: 2025-04-22 | End: 2025-04-22

## 2025-04-22 RX ORDER — KETOROLAC TROMETHAMINE 30 MG/ML
15 INJECTION, SOLUTION INTRAMUSCULAR; INTRAVENOUS ONCE
Status: COMPLETED | OUTPATIENT
Start: 2025-04-22 | End: 2025-04-22

## 2025-04-22 RX ORDER — POLYETHYLENE GLYCOL 3350 17 G/17G
17 POWDER, FOR SOLUTION ORAL DAILY PRN
Status: DISCONTINUED | OUTPATIENT
Start: 2025-04-22 | End: 2025-04-25 | Stop reason: HOSPADM

## 2025-04-22 RX ORDER — BISACODYL 10 MG
10 SUPPOSITORY, RECTAL RECTAL DAILY PRN
Status: DISCONTINUED | OUTPATIENT
Start: 2025-04-22 | End: 2025-04-25 | Stop reason: HOSPADM

## 2025-04-22 RX ORDER — ONDANSETRON 4 MG/1
4 TABLET, ORALLY DISINTEGRATING ORAL EVERY 8 HOURS PRN
Status: DISCONTINUED | OUTPATIENT
Start: 2025-04-22 | End: 2025-04-25 | Stop reason: HOSPADM

## 2025-04-22 RX ORDER — SODIUM CHLORIDE 0.9 % (FLUSH) 0.9 %
10 SYRINGE (ML) INJECTION PRN
Status: COMPLETED | OUTPATIENT
Start: 2025-04-22 | End: 2025-04-22

## 2025-04-22 RX ORDER — ONDANSETRON 2 MG/ML
4 INJECTION INTRAMUSCULAR; INTRAVENOUS ONCE
Status: COMPLETED | OUTPATIENT
Start: 2025-04-22 | End: 2025-04-22

## 2025-04-22 RX ORDER — POTASSIUM CHLORIDE 1500 MG/1
40 TABLET, EXTENDED RELEASE ORAL PRN
Status: DISCONTINUED | OUTPATIENT
Start: 2025-04-22 | End: 2025-04-25 | Stop reason: HOSPADM

## 2025-04-22 RX ORDER — SODIUM CHLORIDE 0.9 % (FLUSH) 0.9 %
10 SYRINGE (ML) INJECTION PRN
Status: DISCONTINUED | OUTPATIENT
Start: 2025-04-22 | End: 2025-04-25 | Stop reason: HOSPADM

## 2025-04-22 RX ORDER — ONDANSETRON 2 MG/ML
4 INJECTION INTRAMUSCULAR; INTRAVENOUS EVERY 6 HOURS PRN
Status: DISCONTINUED | OUTPATIENT
Start: 2025-04-22 | End: 2025-04-25 | Stop reason: HOSPADM

## 2025-04-22 RX ORDER — MAGNESIUM SULFATE HEPTAHYDRATE 40 MG/ML
2000 INJECTION, SOLUTION INTRAVENOUS PRN
Status: DISCONTINUED | OUTPATIENT
Start: 2025-04-22 | End: 2025-04-25 | Stop reason: HOSPADM

## 2025-04-22 RX ADMIN — SODIUM CHLORIDE 100 ML: 9 INJECTION, SOLUTION INTRAVENOUS at 19:58

## 2025-04-22 RX ADMIN — ONDANSETRON 4 MG: 2 INJECTION, SOLUTION INTRAMUSCULAR; INTRAVENOUS at 18:34

## 2025-04-22 RX ADMIN — SODIUM CHLORIDE, PRESERVATIVE FREE 10 ML: 5 INJECTION INTRAVENOUS at 19:58

## 2025-04-22 RX ADMIN — SODIUM CHLORIDE: 0.9 INJECTION, SOLUTION INTRAVENOUS at 22:45

## 2025-04-22 RX ADMIN — SODIUM CHLORIDE 1000 ML: 0.9 INJECTION, SOLUTION INTRAVENOUS at 19:29

## 2025-04-22 RX ADMIN — BENZOCAINE, BUTAMBEN, AND TETRACAINE HYDROCHLORIDE 1 SPRAY: .028; .004; .004 AEROSOL, SPRAY TOPICAL at 21:31

## 2025-04-22 RX ADMIN — KETOROLAC TROMETHAMINE 15 MG: 30 INJECTION, SOLUTION INTRAMUSCULAR at 18:35

## 2025-04-22 RX ADMIN — MORPHINE SULFATE 4 MG: 4 INJECTION, SOLUTION INTRAMUSCULAR; INTRAVENOUS at 18:35

## 2025-04-22 RX ADMIN — MORPHINE SULFATE 4 MG: 4 INJECTION, SOLUTION INTRAMUSCULAR; INTRAVENOUS at 20:54

## 2025-04-22 RX ADMIN — IOPAMIDOL 75 ML: 755 INJECTION, SOLUTION INTRAVENOUS at 19:58

## 2025-04-22 RX ADMIN — WATER 1000 MG: 1 INJECTION INTRAMUSCULAR; INTRAVENOUS; SUBCUTANEOUS at 22:35

## 2025-04-22 RX ADMIN — METRONIDAZOLE 500 MG: 500 INJECTION, SOLUTION INTRAVENOUS at 22:45

## 2025-04-22 ASSESSMENT — PAIN DESCRIPTION - LOCATION
LOCATION: ABDOMEN

## 2025-04-22 ASSESSMENT — LIFESTYLE VARIABLES
HOW MANY STANDARD DRINKS CONTAINING ALCOHOL DO YOU HAVE ON A TYPICAL DAY: PATIENT DOES NOT DRINK
HOW OFTEN DO YOU HAVE A DRINK CONTAINING ALCOHOL: NEVER

## 2025-04-22 ASSESSMENT — PAIN SCALES - GENERAL
PAINLEVEL_OUTOF10: 2
PAINLEVEL_OUTOF10: 2
PAINLEVEL_OUTOF10: 10
PAINLEVEL_OUTOF10: 5

## 2025-04-22 ASSESSMENT — PAIN DESCRIPTION - DESCRIPTORS
DESCRIPTORS: BURNING

## 2025-04-22 ASSESSMENT — ENCOUNTER SYMPTOMS
NAUSEA: 1
SHORTNESS OF BREATH: 0
ABDOMINAL PAIN: 1
BLOOD IN STOOL: 0

## 2025-04-22 ASSESSMENT — PAIN DESCRIPTION - ORIENTATION: ORIENTATION: RIGHT

## 2025-04-22 NOTE — ED PROVIDER NOTES
HISTORY: ORDERING SYSTEM PROVIDED HISTORY: umbilical hernia, reducible but with worsening pain TECHNOLOGIST PROVIDED HISTORY: umbilical hernia, reducible but with worsening pain Decision Support Exception - unselect if not a suspected or confirmed emergency medical condition->Emergency Medical Condition (MA) Reason for Exam: umbilical hernia, reducible but with worsening pain Additional signs and symptoms: Worsening pain to abdomen, states he has an umbilical hernia that hurts as well. Relevant Medical/Surgical History: no surgeries to area FINDINGS: Lower Chest: No acute findings. Organs: Cirrhotic liver morphology.  No focal liver lesion appreciated. Contracted gallbladder with stones.  The pancreas, adrenals and kidneys reveal no acute findings. GI/Bowel: Small umbilical hernia containing a loop of small bowel.  The herniated loop is mildly edematous.  Mild upstream small bowel distension. Previously administered oral contrast is noted in the stomach and small bowel.  Mild colonic stool burden.  Diverticulosis. Pelvis: No acute findings. Peritoneum/Retroperitoneum: No free air or free fluid.  The aorta is normal in caliber.  The visceral branches are patent. No lymphadenopathy. Bones/Soft Tissues: No abnormality identified. *Unless otherwise specified, incidental findings do not require dedicated imaging follow-up.     1. Mildly obstructing small umbilical hernia containing a loop of small bowel. The herniated loop is mildly edematous. 2. Cirrhotic liver morphology. 3. Cholelithiasis. 4. Diverticulosis.       EKG    none    All EKG's are interpreted by the Emergency Department Physician whoeither signs or Co-signs this chart in the absence of a cardiologist.    Impression:  1)  Number and Complexity of Problems    Problem List, DDX, Considered diagnosis:  Patient presents worsening abdominal pain.  Has a known ventral/umbilical hernia.  It is present.  I was able to reduce it quite easily but then it came right

## 2025-04-23 ENCOUNTER — TELEPHONE (OUTPATIENT)
Dept: SURGERY | Age: 65
End: 2025-04-23

## 2025-04-23 ENCOUNTER — APPOINTMENT (OUTPATIENT)
Dept: GENERAL RADIOLOGY | Age: 65
End: 2025-04-23
Payer: COMMERCIAL

## 2025-04-23 PROBLEM — K42.0 INCARCERATED UMBILICAL HERNIA: Status: ACTIVE | Noted: 2025-04-23

## 2025-04-23 PROBLEM — K70.30 ALCOHOLIC CIRRHOSIS OF LIVER WITHOUT ASCITES (HCC): Status: ACTIVE | Noted: 2025-04-23

## 2025-04-23 LAB
ANION GAP SERPL CALCULATED.3IONS-SCNC: 12 MMOL/L (ref 9–16)
BASOPHILS # BLD: 0.1 K/UL (ref 0–0.2)
BASOPHILS NFR BLD: 1 % (ref 0–2)
BUN SERPL-MCNC: 22 MG/DL (ref 8–23)
CALCIUM SERPL-MCNC: 9 MG/DL (ref 8.6–10.4)
CHLORIDE SERPL-SCNC: 108 MMOL/L (ref 98–107)
CO2 SERPL-SCNC: 22 MMOL/L (ref 20–31)
CREAT SERPL-MCNC: 1.4 MG/DL (ref 0.7–1.2)
CREAT UR-MCNC: 145 MG/DL (ref 39–259)
EOSINOPHIL # BLD: 0.2 K/UL (ref 0–0.4)
EOSINOPHILS RELATIVE PERCENT: 3 % (ref 0–4)
ERYTHROCYTE [DISTWIDTH] IN BLOOD BY AUTOMATED COUNT: 15.1 % (ref 11.5–14.9)
GFR, ESTIMATED: 56 ML/MIN/1.73M2
GLUCOSE SERPL-MCNC: 92 MG/DL (ref 74–99)
HCT VFR BLD AUTO: 43.2 % (ref 41–53)
HGB BLD-MCNC: 14.4 G/DL (ref 13.5–17.5)
LYMPHOCYTES NFR BLD: 1.6 K/UL (ref 1–4.8)
LYMPHOCYTES RELATIVE PERCENT: 22 % (ref 24–44)
MCH RBC QN AUTO: 32.2 PG (ref 26–34)
MCHC RBC AUTO-ENTMCNC: 33.5 G/DL (ref 31–37)
MCV RBC AUTO: 96.4 FL (ref 80–100)
MONOCYTES NFR BLD: 0.6 K/UL (ref 0.1–1.3)
MONOCYTES NFR BLD: 8 % (ref 1–7)
NEUTROPHILS NFR BLD: 66 % (ref 36–66)
NEUTS SEG NFR BLD: 4.8 K/UL (ref 1.3–9.1)
PLATELET # BLD AUTO: 97 K/UL (ref 150–450)
PMV BLD AUTO: 8.1 FL (ref 6–12)
POTASSIUM SERPL-SCNC: 3.9 MMOL/L (ref 3.7–5.3)
RBC # BLD AUTO: 4.48 M/UL (ref 4.5–5.9)
SODIUM SERPL-SCNC: 142 MMOL/L (ref 136–145)
SODIUM UR-SCNC: 205 MMOL/L
WBC OTHER # BLD: 7.3 K/UL (ref 3.5–11)

## 2025-04-23 PROCEDURE — 6370000000 HC RX 637 (ALT 250 FOR IP): Performed by: NURSE PRACTITIONER

## 2025-04-23 PROCEDURE — 99223 1ST HOSP IP/OBS HIGH 75: CPT | Performed by: SURGERY

## 2025-04-23 PROCEDURE — 36415 COLL VENOUS BLD VENIPUNCTURE: CPT

## 2025-04-23 PROCEDURE — 6360000002 HC RX W HCPCS: Performed by: NURSE PRACTITIONER

## 2025-04-23 PROCEDURE — 2580000003 HC RX 258: Performed by: INTERNAL MEDICINE

## 2025-04-23 PROCEDURE — 99223 1ST HOSP IP/OBS HIGH 75: CPT | Performed by: INTERNAL MEDICINE

## 2025-04-23 PROCEDURE — 82570 ASSAY OF URINE CREATININE: CPT

## 2025-04-23 PROCEDURE — 2500000003 HC RX 250 WO HCPCS: Performed by: NURSE PRACTITIONER

## 2025-04-23 PROCEDURE — 2580000003 HC RX 258: Performed by: STUDENT IN AN ORGANIZED HEALTH CARE EDUCATION/TRAINING PROGRAM

## 2025-04-23 PROCEDURE — 74018 RADEX ABDOMEN 1 VIEW: CPT

## 2025-04-23 PROCEDURE — 85025 COMPLETE CBC W/AUTO DIFF WBC: CPT

## 2025-04-23 PROCEDURE — 1200000000 HC SEMI PRIVATE

## 2025-04-23 PROCEDURE — 80048 BASIC METABOLIC PNL TOTAL CA: CPT

## 2025-04-23 PROCEDURE — 84300 ASSAY OF URINE SODIUM: CPT

## 2025-04-23 PROCEDURE — 6370000000 HC RX 637 (ALT 250 FOR IP): Performed by: INTERNAL MEDICINE

## 2025-04-23 RX ORDER — MORPHINE SULFATE 4 MG/ML
4 INJECTION, SOLUTION INTRAMUSCULAR; INTRAVENOUS EVERY 4 HOURS PRN
Status: DISCONTINUED | OUTPATIENT
Start: 2025-04-23 | End: 2025-04-25 | Stop reason: HOSPADM

## 2025-04-23 RX ORDER — POTASSIUM CHLORIDE 7.45 MG/ML
10 INJECTION INTRAVENOUS PRN
Status: DISCONTINUED | OUTPATIENT
Start: 2025-04-23 | End: 2025-04-25 | Stop reason: HOSPADM

## 2025-04-23 RX ORDER — METRONIDAZOLE 500 MG/100ML
500 INJECTION, SOLUTION INTRAVENOUS EVERY 8 HOURS
Status: DISCONTINUED | OUTPATIENT
Start: 2025-04-23 | End: 2025-04-25 | Stop reason: HOSPADM

## 2025-04-23 RX ORDER — MORPHINE SULFATE 2 MG/ML
2 INJECTION, SOLUTION INTRAMUSCULAR; INTRAVENOUS EVERY 4 HOURS PRN
Status: DISCONTINUED | OUTPATIENT
Start: 2025-04-23 | End: 2025-04-25 | Stop reason: HOSPADM

## 2025-04-23 RX ADMIN — METRONIDAZOLE 500 MG: 500 INJECTION, SOLUTION INTRAVENOUS at 14:17

## 2025-04-23 RX ADMIN — ACETAMINOPHEN 650 MG: 325 TABLET ORAL at 21:18

## 2025-04-23 RX ADMIN — MORPHINE SULFATE 4 MG: 4 INJECTION, SOLUTION INTRAMUSCULAR; INTRAVENOUS at 05:52

## 2025-04-23 RX ADMIN — POTASSIUM CHLORIDE 10 MEQ: 7.46 INJECTION, SOLUTION INTRAVENOUS at 01:37

## 2025-04-23 RX ADMIN — MELATONIN TAB 3 MG 6 MG: 3 TAB at 21:18

## 2025-04-23 RX ADMIN — METRONIDAZOLE 500 MG: 500 INJECTION, SOLUTION INTRAVENOUS at 21:06

## 2025-04-23 RX ADMIN — SODIUM CHLORIDE, PRESERVATIVE FREE 10 ML: 5 INJECTION INTRAVENOUS at 07:54

## 2025-04-23 RX ADMIN — PHENOL 1 SPRAY: 1.4 SPRAY ORAL at 09:37

## 2025-04-23 RX ADMIN — POTASSIUM CHLORIDE 10 MEQ: 7.46 INJECTION, SOLUTION INTRAVENOUS at 05:50

## 2025-04-23 RX ADMIN — SODIUM CHLORIDE: 0.9 INJECTION, SOLUTION INTRAVENOUS at 07:57

## 2025-04-23 RX ADMIN — PHENOL 1 SPRAY: 1.4 SPRAY ORAL at 05:57

## 2025-04-23 RX ADMIN — PHENOL 1 SPRAY: 1.4 SPRAY ORAL at 12:54

## 2025-04-23 RX ADMIN — POTASSIUM CHLORIDE 10 MEQ: 7.46 INJECTION, SOLUTION INTRAVENOUS at 03:40

## 2025-04-23 RX ADMIN — MORPHINE SULFATE 2 MG: 2 INJECTION, SOLUTION INTRAMUSCULAR; INTRAVENOUS at 14:58

## 2025-04-23 RX ADMIN — METRONIDAZOLE 500 MG: 500 INJECTION, SOLUTION INTRAVENOUS at 05:56

## 2025-04-23 RX ADMIN — WATER 1000 MG: 1 INJECTION INTRAMUSCULAR; INTRAVENOUS; SUBCUTANEOUS at 22:22

## 2025-04-23 RX ADMIN — MORPHINE SULFATE 4 MG: 4 INJECTION, SOLUTION INTRAMUSCULAR; INTRAVENOUS at 01:28

## 2025-04-23 RX ADMIN — PHENOL 1 SPRAY: 1.4 SPRAY ORAL at 14:57

## 2025-04-23 RX ADMIN — POTASSIUM CHLORIDE 10 MEQ: 7.46 INJECTION, SOLUTION INTRAVENOUS at 04:46

## 2025-04-23 RX ADMIN — SODIUM CHLORIDE: 0.9 INJECTION, SOLUTION INTRAVENOUS at 17:31

## 2025-04-23 ASSESSMENT — PAIN DESCRIPTION - FREQUENCY: FREQUENCY: CONTINUOUS

## 2025-04-23 ASSESSMENT — PAIN DESCRIPTION - DESCRIPTORS
DESCRIPTORS: SORE
DESCRIPTORS: SORE
DESCRIPTORS: SORE;TENDER
DESCRIPTORS: SORE
DESCRIPTORS: ACHING;DISCOMFORT
DESCRIPTORS: SORE
DESCRIPTORS: ACHING;DISCOMFORT

## 2025-04-23 ASSESSMENT — PAIN - FUNCTIONAL ASSESSMENT
PAIN_FUNCTIONAL_ASSESSMENT: ACTIVITIES ARE NOT PREVENTED

## 2025-04-23 ASSESSMENT — PAIN SCALES - GENERAL
PAINLEVEL_OUTOF10: 7
PAINLEVEL_OUTOF10: 1
PAINLEVEL_OUTOF10: 7
PAINLEVEL_OUTOF10: 7
PAINLEVEL_OUTOF10: 8
PAINLEVEL_OUTOF10: 4
PAINLEVEL_OUTOF10: 3

## 2025-04-23 ASSESSMENT — ENCOUNTER SYMPTOMS
SORE THROAT: 0
SHORTNESS OF BREATH: 0
RHINORRHEA: 0
COUGH: 0

## 2025-04-23 ASSESSMENT — PAIN DESCRIPTION - LOCATION
LOCATION: ABDOMEN
LOCATION: ABDOMEN
LOCATION: THROAT
LOCATION: ABDOMEN
LOCATION: THROAT

## 2025-04-23 ASSESSMENT — PAIN DESCRIPTION - ORIENTATION
ORIENTATION: MID
ORIENTATION: MID

## 2025-04-23 ASSESSMENT — PAIN DESCRIPTION - ONSET: ONSET: ON-GOING

## 2025-04-23 ASSESSMENT — PAIN DESCRIPTION - PAIN TYPE: TYPE: ACUTE PAIN

## 2025-04-23 NOTE — PROGRESS NOTES
[facility] Internal Medicine  Regan Rawls MD; Renan Chavarria MD; Haider Knight MD; MD Valentina Dejesus MD; Didier Roberto MD  [facility] Internal Medicine   IN-PATIENT SERVICE  [facility]                 Date:   4/23/2025  Patientname:  Shaq Lui  Date of admission:  4/22/2025  6:19 PM  MRN:   [id number]  Account:  [id number]  YOB: 1960  PCP:    Daniel Alanis MD  Room:   2064/2064-01  Code Status:    Full Code      Chief Complaint:     Chief Complaint   Patient presents with    Abdominal Pain       History of Present Illness:     Shaq Lui is a 65 y.o. Non- / non  male who presents with Abdominal Pain   and is admitted to the hospital for the management of Obstructed umbilical hernia.    Patient's medical history significant for alcohol abuse-currently in remission, anemia, cirrhosis and hypokalemia.     According to patient, he has been having intermittent episodes of mid-abdominal pain and gas for the past 2 weeks.  Reports that this morning he ate a cinnamon roll and milk and approximately 10 minutes later he developed worsening abdominal pain and bloating that progressively worsened with time.  Reports that he tried multiple over-the-counter medications without relief.  Eventually, the symptoms became unbearable, so he came to the ED for evaluation.  Patient reports known history of umbilical hernia, which he states feels \"stuck\" for the past few hours.  Symptoms are associated with nausea.  Patient denies fevers, chills, constipation, and diarrhea.  Reports that he is passing gas.  Bowel sounds active x 4 quads.  Umbilical hernia is reducible; however, it comes right back again.    CT abdomen pelvis obtained in ED shows mildly obstructing small umbilical hernia containing a loop of small bowel, which is mildly edematous.  Cirrhotic liver morphology and cholelithiasis also noted.  WBC 10.3, lipase 27.  Lactic acid 2.8, then

## 2025-04-23 NOTE — CARE COORDINATION
Case Management Assessment  Initial Evaluation    Date/Time of Evaluation: 4/23/2025 10:06 AM  Assessment Completed by: Viri Reyes RN    If patient is discharged prior to next notation, then this note serves as note for discharge by case management.    Patient Name: Shaq Lui                   YOB: 1960  Diagnosis: Obstructed umbilical hernia [K42.0]                   Date / Time: 4/22/2025  6:19 PM    Patient Admission Status: Inpatient   Readmission Risk (Low < 19, Mod (19-27), High > 27): Readmission Risk Score: 7    Current PCP: Daniel Alanis MD  PCP verified by CM? Yes    Chart Reviewed: Yes      History Provided by: Patient  Patient Orientation: Alert and Oriented    Patient Cognition: Alert    Hospitalization in the last 30 days (Readmission):  No    If yes, Readmission Assessment in  Navigator will be completed.    Advance Directives:      Code Status: Full Code   Patient's Primary Decision Maker is: Legal Next of Kin      Discharge Planning:    Patient lives with: Spouse/Significant Other Type of Home: House  Primary Care Giver: Self  Patient Support Systems include: Spouse/Significant Other, Family Members   Current Financial resources: Other (Comment) (Commercial)  Current community resources: None  Current services prior to admission: None            Current DME:              Type of Home Care services:  None    ADLS  Prior functional level: Independent in ADLs/IADLs  Current functional level: Independent in ADLs/IADLs    PT AM-PAC:   /24  OT AM-PAC:   /24    Family can provide assistance at DC: Yes  Would you like Case Management to discuss the discharge plan with any other family members/significant others, and if so, who? No  Plans to Return to Present Housing: Yes  Other Identified Issues/Barriers to RETURNING to current housing: None.  Potential Assistance needed at discharge: N/A            Potential DME:    Patient expects to discharge to: House  Plan for  transportation at discharge: Self    Financial    [place] / Product Type: *No Product type* /     Does insurance require precert for SNF: Yes    Potential assistance Purchasing Medications: No  Meds-to-Beds request:        [id number] - [place], [place] - [address] [phone] - F [phone]  [address] [address]  Phone: [phone] Fax: [phone]      Notes:    Factors facilitating achievement of predicted outcomes: Family support, Cooperative, and Pleasant    Barriers to discharge: Medical complications    Additional Case Management Notes[place] 4/[place]. Pt from [facility] w/SO. Independent & Drives. DME none. VNS none. Denies needs. Surgery Consult. NPO. LUCHO. IV Rocephin. IV Flagyl. Cr 1.4. Will follow for needs. //AM     The Plan for Transition of Care is related to the following treatment goals of Obstructed umbilical hernia [K42.0]    IF APPLICABLE: The Patient and/or patient representative Shaq and his family were provided with a choice of provider and agrees with the discharge plan. Freedom of choice list with basic dialogue that supports the patient's individualized plan of care/goals and shares the quality data associated with the providers was provided to: Patient   Patient Representative Name:       The Patient and/or Patient Representative Agree with the Discharge Plan? Yes    Viri Reyes RN  Case Management Department  Ph: [phone] Fax: [phone]

## 2025-04-23 NOTE — PROGRESS NOTES
New patient admission, vital signs complete, admission questions complete, patient is alert and oriented to room and belongings, no questions at this time.

## 2025-04-23 NOTE — H&P
[facility]   IN-PATIENT SERVICE   [facility]    HISTORY AND PHYSICAL EXAMINATION            Date:   4/23/2025  Patient name:  Shaq Lui  Date of admission:  4/22/2025  6:19 PM  MRN:   [id number]  Account:  [id number]  YOB: 1960  PCP:    Daniel Alanis MD  Room:   [facility]  Code Status:    Full Code    Chief Complaint:     Chief Complaint   Patient presents with    Abdominal Pain       History Obtained From:     patient    History of Present Illness:     The patient is a 65 y.o.  Non- / non  male who presents with Abdominal Pain   and he is admitted to the hospital for the management of      Shaq Lui is a 65 y.o. Non- / non  male who presents with Abdominal Pain   and is admitted to the hospital for the management of Obstructed umbilical hernia.     Patient's medical history significant for alcohol abuse-currently in remission, anemia, cirrhosis and hypokalemia.      According to patient, he has been having intermittent episodes of mid-abdominal pain and gas for the past 2 weeks.  Reports that this morning he ate a cinnamon roll and milk and approximately 10 minutes later he developed worsening abdominal pain and bloating that progressively worsened with time.  Reports that he tried multiple over-the-counter medications without relief.  Eventually, the symptoms became unbearable, so he came to the ED for evaluation.  Patient reports known history of umbilical hernia, which he states feels \"stuck\" for the past few hours.  Symptoms are associated with nausea.  Patient denies fevers, chills, constipation, and diarrhea.  Reports that he is passing gas.  Bowel sounds active x 4 quads.  Umbilical hernia is reducible; however, it comes right back again.     CT abdomen pelvis obtained in ED shows mildly obstructing small umbilical hernia containing a loop of small bowel, which is mildly edematous.  Cirrhotic liver morphology and

## 2025-04-23 NOTE — PLAN OF CARE
Problem: Discharge Planning  Goal: Discharge to home or other facility with appropriate resources  Outcome: Progressing  Flowsheets (Taken 4/23/2025 1755)  Discharge to home or other facility with appropriate resources:   Identify barriers to discharge with patient and caregiver   Identify discharge learning needs (meds, wound care, etc)     Problem: Pain  Goal: Verbalizes/displays adequate comfort level or baseline comfort level  Outcome: Progressing  Flowsheets (Taken 4/23/2025 1755)  Verbalizes/displays adequate comfort level or baseline comfort level:   Encourage patient to monitor pain and request assistance   Assess pain using appropriate pain scale   Administer analgesics based on type and severity of pain and evaluate response   Implement non-pharmacological measures as appropriate and evaluate response     Problem: Safety - Adult  Goal: Free from fall injury  Outcome: Progressing  Flowsheets (Taken 4/23/2025 1755)  Free From Fall Injury:   Instruct family/caregiver on patient safety   Based on caregiver fall risk screen, instruct family/caregiver to ask for assistance with transferring infant if caregiver noted to have fall risk factors

## 2025-04-23 NOTE — ED NOTES
Report given to KASSIDY Arredondo from Med Surg.   Report method by phone   The following was reviewed with receiving RN:   Current vital signs:  /86   Pulse 80   Temp 97.3 °F (36.3 °C) (Oral)   Resp 25   Ht 1.854 m (6' 1\")   Wt 108 kg (238 lb)   SpO2 96%   BMI 31.40 kg/m²                      Any medication or safety alerts were reviewed. Any pending diagnostics and notifications were also reviewed, as well as any safety concerns or issues, abnormal labs, abnormal imaging, and abnormal assessment findings. Questions were answered.

## 2025-04-24 PROBLEM — D69.6 THROMBOCYTOPENIA: Status: ACTIVE | Noted: 2025-04-24

## 2025-04-24 LAB
ANION GAP SERPL CALCULATED.3IONS-SCNC: 9 MMOL/L (ref 9–16)
BASOPHILS # BLD: 0 K/UL (ref 0–0.2)
BASOPHILS NFR BLD: 1 % (ref 0–2)
BUN SERPL-MCNC: 18 MG/DL (ref 8–23)
CALCIUM SERPL-MCNC: 8 MG/DL (ref 8.6–10.4)
CHLORIDE SERPL-SCNC: 107 MMOL/L (ref 98–107)
CO2 SERPL-SCNC: 22 MMOL/L (ref 20–31)
CREAT SERPL-MCNC: 1.2 MG/DL (ref 0.7–1.2)
EOSINOPHIL # BLD: 0.1 K/UL (ref 0–0.4)
EOSINOPHILS RELATIVE PERCENT: 4 % (ref 0–4)
ERYTHROCYTE [DISTWIDTH] IN BLOOD BY AUTOMATED COUNT: 14.2 % (ref 11.5–14.9)
GFR, ESTIMATED: 68 ML/MIN/1.73M2
GLUCOSE SERPL-MCNC: 114 MG/DL (ref 74–99)
HCT VFR BLD AUTO: 38.6 % (ref 41–53)
HGB BLD-MCNC: 13 G/DL (ref 13.5–17.5)
LYMPHOCYTES NFR BLD: 0.7 K/UL (ref 1–4.8)
LYMPHOCYTES RELATIVE PERCENT: 20 % (ref 24–44)
MCH RBC QN AUTO: 32 PG (ref 26–34)
MCHC RBC AUTO-ENTMCNC: 33.8 G/DL (ref 31–37)
MCV RBC AUTO: 94.6 FL (ref 80–100)
MONOCYTES NFR BLD: 0.3 K/UL (ref 0.1–1.3)
MONOCYTES NFR BLD: 8 % (ref 1–7)
NEUTROPHILS NFR BLD: 67 % (ref 36–66)
NEUTS SEG NFR BLD: 2.4 K/UL (ref 1.3–9.1)
PLATELET # BLD AUTO: 69 K/UL (ref 150–450)
PMV BLD AUTO: 8.5 FL (ref 6–12)
POTASSIUM SERPL-SCNC: 3.6 MMOL/L (ref 3.7–5.3)
RBC # BLD AUTO: 4.07 M/UL (ref 4.5–5.9)
RETICS # AUTO: 0.02 M/UL (ref 0.02–0.1)
RETICS/RBC NFR AUTO: 0.4 % (ref 0.5–2)
SODIUM SERPL-SCNC: 138 MMOL/L (ref 136–145)
WBC OTHER # BLD: 3.5 K/UL (ref 3.5–11)

## 2025-04-24 PROCEDURE — 99232 SBSQ HOSP IP/OBS MODERATE 35: CPT | Performed by: SURGERY

## 2025-04-24 PROCEDURE — 2580000003 HC RX 258: Performed by: INTERNAL MEDICINE

## 2025-04-24 PROCEDURE — 1200000000 HC SEMI PRIVATE

## 2025-04-24 PROCEDURE — 80048 BASIC METABOLIC PNL TOTAL CA: CPT

## 2025-04-24 PROCEDURE — 36415 COLL VENOUS BLD VENIPUNCTURE: CPT

## 2025-04-24 PROCEDURE — 6360000002 HC RX W HCPCS: Performed by: NURSE PRACTITIONER

## 2025-04-24 PROCEDURE — 99222 1ST HOSP IP/OBS MODERATE 55: CPT | Performed by: INTERNAL MEDICINE

## 2025-04-24 PROCEDURE — 85025 COMPLETE CBC W/AUTO DIFF WBC: CPT

## 2025-04-24 PROCEDURE — 2500000003 HC RX 250 WO HCPCS: Performed by: NURSE PRACTITIONER

## 2025-04-24 PROCEDURE — 99233 SBSQ HOSP IP/OBS HIGH 50: CPT | Performed by: INTERNAL MEDICINE

## 2025-04-24 PROCEDURE — 6370000000 HC RX 637 (ALT 250 FOR IP): Performed by: INTERNAL MEDICINE

## 2025-04-24 PROCEDURE — 85045 AUTOMATED RETICULOCYTE COUNT: CPT

## 2025-04-24 RX ADMIN — METRONIDAZOLE 500 MG: 500 INJECTION, SOLUTION INTRAVENOUS at 22:39

## 2025-04-24 RX ADMIN — METRONIDAZOLE 500 MG: 500 INJECTION, SOLUTION INTRAVENOUS at 15:14

## 2025-04-24 RX ADMIN — MELATONIN TAB 3 MG 6 MG: 3 TAB at 22:39

## 2025-04-24 RX ADMIN — SODIUM CHLORIDE: 0.9 INJECTION, SOLUTION INTRAVENOUS at 14:33

## 2025-04-24 RX ADMIN — WATER 1000 MG: 1 INJECTION INTRAMUSCULAR; INTRAVENOUS; SUBCUTANEOUS at 22:31

## 2025-04-24 RX ADMIN — METRONIDAZOLE 500 MG: 500 INJECTION, SOLUTION INTRAVENOUS at 05:49

## 2025-04-24 ASSESSMENT — ENCOUNTER SYMPTOMS
COUGH: 0
SORE THROAT: 0
SHORTNESS OF BREATH: 0
RHINORRHEA: 0

## 2025-04-24 ASSESSMENT — PAIN SCALES - GENERAL: PAINLEVEL_OUTOF10: 2

## 2025-04-24 NOTE — TELEPHONE ENCOUNTER
This patient is currently admitted at [facility].    He needs to be scheduled for robotic laparoscopic periumbilical ventral hernia repair with mesh under general anesthesia at [facility] next week Tuesday, April 29.    Please talk to me about this patient tomorrow.

## 2025-04-24 NOTE — CARE COORDINATION
ONGOING DISCHARGE PLAN:    Patient is alert and oriented x4.    Spoke with patient regarding discharge plan and patient confirms that plan is still home with no needs.    Surgery Consult. Clear liquid diet. IV Rocephin. IV Flagyl.     Will continue to follow for additional discharge needs.    If patient is discharged prior to next notation, then this note serves as note for discharge by case management.    Electronically signed by Viri Reyes RN on 4/24/2025 at 9:18 AM

## 2025-04-24 NOTE — TELEPHONE ENCOUNTER
Called patient to schedule a procedure.  Patient will call back to to schedule once he get home form the hospital.  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

## 2025-04-24 NOTE — PLAN OF CARE
Problem: Discharge Planning  Goal: Discharge to home or other facility with appropriate resources  4/24/2025 0055 by Parish Hutchins, RN  Outcome: Progressing  Flowsheets (Taken 4/23/2025 2115)  Discharge to home or other facility with appropriate resources: Identify barriers to discharge with patient and caregiver  4/23/2025 1755 by Tiffany Dominguez  Outcome: Progressing  Flowsheets (Taken 4/23/2025 1755)  Discharge to home or other facility with appropriate resources:   Identify barriers to discharge with patient and caregiver   Identify discharge learning needs (meds, wound care, etc)     Problem: Pain  Goal: Verbalizes/displays adequate comfort level or baseline comfort level  4/24/2025 0055 by Parish Hutchins, RN  Outcome: Progressing  Flowsheets (Taken 4/23/2025 2118)  Verbalizes/displays adequate comfort level or baseline comfort level:   Encourage patient to monitor pain and request assistance   Administer analgesics based on type and severity of pain and evaluate response  4/23/2025 1755 by Tiffany Dominguez  Outcome: Progressing  Flowsheets (Taken 4/23/2025 1755)  Verbalizes/displays adequate comfort level or baseline comfort level:   Encourage patient to monitor pain and request assistance   Assess pain using appropriate pain scale   Administer analgesics based on type and severity of pain and evaluate response   Implement non-pharmacological measures as appropriate and evaluate response     Problem: Safety - Adult  Goal: Free from fall injury  4/24/2025 0055 by Parish Hutchins, RN  Outcome: Progressing  Flowsheets (Taken 4/24/2025 0054)  Free From Fall Injury: Instruct family/caregiver on patient safety  4/23/2025 1755 by Tiffany Dominguez  Outcome: Progressing  Flowsheets (Taken 4/23/2025 1755)  Free From Fall Injury:   Instruct family/caregiver on patient safety   Based on caregiver fall risk screen, instruct family/caregiver to ask for assistance with transferring infant if caregiver noted to have fall risk

## 2025-04-24 NOTE — PROGRESS NOTES
[facility]   IN-PATIENT SERVICE   [facility]  Progress            Date:   4/24/2025  Patient name:  Shaq Lui  Date of admission:  4/22/2025  6:19 PM  MRN:   [id number]  Account:  [id number]  YOB: 1960  PCP:    Daniel Alanis MD  Room:   2064/2064-01  Code Status:    Full Code    Chief Complaint:     Chief Complaint   Patient presents with    Abdominal Pain       History Obtained From:     patient    History of Present Illness:     The patient is a 65 y.o.  Non- / non  male who presents with Abdominal Pain   and he is admitted to the hospital for the management of      Shaq Lui is a 65 y.o. Non- / non  male who presents with Abdominal Pain   and is admitted to the hospital for the management of Obstructed umbilical hernia.     Patient's medical history significant for alcohol abuse-currently in remission, anemia, cirrhosis and hypokalemia.      According to patient, he has been having intermittent episodes of mid-abdominal pain and gas for the past 2 weeks.  Reports that this morning he ate a cinnamon roll and milk and approximately 10 minutes later he developed worsening abdominal pain and bloating that progressively worsened with time.  Reports that he tried multiple over-the-counter medications without relief.  Eventually, the symptoms became unbearable, so he came to the ED for evaluation.  Patient reports known history of umbilical hernia, which he states feels \"stuck\" for the past few hours.  Symptoms are associated with nausea.  Patient denies fevers, chills, constipation, and diarrhea.  Reports that he is passing gas.  Bowel sounds active x 4 quads.  Umbilical hernia is reducible; however, it comes right back again.     CT abdomen pelvis obtained in ED shows mildly obstructing small umbilical hernia containing a loop of small bowel, which is mildly edematous.  Cirrhotic liver morphology and cholelithiasis also noted.  WBC 10.3,  normal rate, regular rhythm, no murmur, gallop, rub.  Abdomen: Soft, nontender, nondistended, normal bowel sounds, no hepatomegaly or splenomegaly  Neurologic: There are no new focal motor or sensory deficits, normal muscle tone and bulk, no abnormal sensation, normal speech, cranial nerves II through XII grossly intact  Skin: No gross lesions, rashes, bruising or bleeding on exposed skin area  Extremities:  peripheral pulses palpable, no pedal edema or calf pain with palpation  Psych: normal affect     Investigations:      Laboratory Testing:  Recent Results (from the past 24 hours)   Sodium, Random Ur    Collection Time: 04/23/25  1:50 PM   Result Value Ref Range    Sodium, Ur 205 mmol/L   Creatinine,Random Ur    Collection Time: 04/23/25  1:50 PM   Result Value Ref Range    Creatinine, Ur 145.0 39.0 - 259.0 mg/dL   Basic Metabolic Panel w/ Reflex to MG    Collection Time: 04/24/25  6:07 AM   Result Value Ref Range    Sodium 138 136 - 145 mmol/L    Potassium 3.6 (L) 3.7 - 5.3 mmol/L    Chloride 107 98 - 107 mmol/L    CO2 22 20 - 31 mmol/L    Anion Gap 9 9 - 16 mmol/L    Glucose 114 (H) 74 - 99 mg/dL    BUN 18 8 - 23 mg/dL    Creatinine 1.2 0.7 - 1.2 mg/dL    Est, Glom Filt Rate 68 >60 mL/min/1.73m2    Calcium 8.0 (L) 8.6 - 10.4 mg/dL   CBC with auto differential    Collection Time: 04/24/25  6:07 AM   Result Value Ref Range    WBC 3.5 3.5 - 11.0 k/uL    RBC 4.07 (L) 4.5 - 5.9 m/uL    Hemoglobin 13.0 (L) 13.5 - 17.5 g/dL    Hematocrit 38.6 (L) 41 - 53 %    MCV 94.6 80 - 100 fL    MCH 32.0 26 - 34 pg    MCHC 33.8 31 - 37 g/dL    RDW 14.2 11.5 - 14.9 %    Platelets 69 (L) 150 - 450 k/uL    MPV 8.5 6.0 - 12.0 fL    Neutrophils % 67 (H) 36 - 66 %    Lymphocytes % 20 (L) 24 - 44 %    Monocytes % 8 (H) 1 - 7 %    Eosinophils % 4 0 - 4 %    Basophils % 1 0 - 2 %    Neutrophils Absolute 2.40 1.3 - 9.1 k/uL    Lymphocytes Absolute 0.70 (L) 1.0 - 4.8 k/uL    Monocytes Absolute 0.30 0.1 - 1.3 k/uL    Eosinophils Absolute 0.10

## 2025-04-24 NOTE — PROGRESS NOTES
Physician Progress Note      PATIENT:               RANJITH PERALTA  [facility] #:                  [id number]  :                       1960  ADMIT DATE:       2025 6:19 PM  DISCH DATE:  RESPONDING  PROVIDER #:        Iva Bond MD          QUERY TEXT:    The patient was admitted with Obstructed umbilical hernia. Acute Kidney Injury   is documented in the H&P By IM on .Labs stated Cr-1.4, BUN-21,22 GFR-56.   Please provide additional clinical indicators supportive of your   documentation. Or please document if the diagnosis of RAIZA has been ruled out   after study.    The clinical indicators include:  Pt Age:M65Y, Hypokalemia    - IM H&P stated RAIZA, likely prerenal? Will check bladder scan, calculate   Dana, continue to monitor,  - Labs stated Cr-1.4,1.4 BUN-21,22 GFR-56    -IVF, serial labs,0.9% Sodium chloride infusion.    Thanks,  Vijay montejo- CDS  Options provided:  -- Acute kidney injury evidenced by, Please document evidence as well as a   numerical baseline creatinine, if known.  -- Acute kidney injury ruled out after study  -- Other - I will add my own diagnosis  -- Disagree - Not applicable / Not valid  -- Disagree - Clinically unable to determine / Unknown  -- Refer to Clinical Documentation Reviewer    PROVIDER RESPONSE TEXT:    Acute kidney injury was ruled out after study.    Query created by: Vijay Montejo on 2025 3:02 AM      Electronically signed by:  Iva Bond MD 2025 5:28 PM

## 2025-04-24 NOTE — CONSULTS
Today's Date: 4/24/2025  Patient Name: Shaq Lui  Date of admission: 4/22/2025  6:19 PM  Patient's age: 65 y.o., 1960  Admission Dx: Obstructed umbilical hernia [K42.0]    Reason for Consult: Thrombocytopenia  Requesting Physician: Iva Bond MD    CHIEF COMPLAINT:    Chief Complaint   Patient presents with    Abdominal Pain       History Obtained From:  patient and chart    HISTORY OF PRESENT ILLNESS:      Shaq Lui is a 65 y.o. male who is admitted to the hospital on 4/22/2025  for abdominal pain  Patient has a history of alcohol abuse, and history of anemia, liver cirrhosis.  Patient had a CT abdomen pelvis which showed mild obstructing small umbilical hernia.  Cirrhotic morphology noted in the liver with cholelithiasis.  Lab work showed platelet 69K therefore hematology was consulted for evaluation of thrombocytopenia.    Past Medical History:   has a past medical history of Abdominal pain, Anemia, Cirrhosis (HCC), Ear pain, bilateral, Hearing loss, History of blood transfusion, and Itching.    Past Surgical History:   has a past surgical history that includes Tonsillectomy; Colonoscopy; Endoscopy, colon, diagnostic; Colonoscopy (N/A, 2/22/2023); and Colonoscopy (N/A, 3/22/2023).     Medications:    Prior to Admission medications    Medication Sig Start Date End Date Taking? Authorizing Provider   Ferrous Sulfate (IRON PO) Take by mouth   Yes Miguel Gotti MD   Cyanocobalamin (VITAMIN B 12 PO) Take by mouth   Yes Miguel Gotti MD     Current Facility-Administered Medications   Medication Dose Route Frequency Provider Last Rate Last Admin    morphine injection 2 mg  2 mg IntraVENous Q4H PRN Berna Mac APRN - CNP   2 mg at 04/23/25 1458    Or    morphine injection 4 mg  4 mg IntraVENous Q4H PRN Berna Mac APRN - CNP   4 mg at 04/23/25 0552    potassium chloride 10 mEq/100 mL IVPB (Peripheral Line)  10 mEq IntraVENous PRN Berna Mac APRN -  mL/hr at 04/23/25

## 2025-04-24 NOTE — PROGRESS NOTES
[facility] General Surgery   Harley Chatterjee MD, FACS  Arina Feliz, APRN-CNP  [address], [address]  [place] [address]  P: [phone], F: [phone]    General and Robotic Surgery  Progress Note             PATIENT NAME: Shaq Lui   :  1960   MRN: [id number]   PCP:  Daniel Alanis MD     TODAY'S DATE: 2025    65 y.o. male seen and examined.  He feels 100% better.  Passing flatus.  Bowels are moving.  Tolerating clears.  Afebrile vital signs are stable.    PAST MEDICAL HISTORY     Past Medical History:   Diagnosis Date    Abdominal pain     Anemia     Cirrhosis (HCC)     Ear pain, bilateral     Hearing loss     History of blood transfusion     Itching        PROBLEM LIST     Patient Active Problem List   Diagnosis    Hyperbilirubinemia    Alcoholic hepatitis without ascites (HCC)    Unintentional weight loss    Hypotension due to hypovolemia    Anemia    Hyponatremia    Hypokalemia    Alcohol abuse    Severe malnutrition    S/P colonoscopic polypectomy    Dysplastic polyp of colon    Obstructed umbilical hernia    Alcoholic cirrhosis of liver without ascites (HCC)    Incarcerated umbilical hernia       SURGICAL HISTORY       Past Surgical History:   Procedure Laterality Date    COLONOSCOPY      COLONOSCOPY N/A 2023    COLONOSCOPY POLYPECTOMY HOT SNARE WITH RESOLUTION CLIPS X4 WITH EPI INJECTION performed by Cliff Zamudio MD at [facility]    COLONOSCOPY N/A 3/22/2023    COLONOSCOPY POLYPECTOMY SNARE/COLD BIOPSY performed by Cliff Zamudio MD at [facility]    ENDOSCOPY, COLON, DIAGNOSTIC      TONSILLECTOMY           Review of Systems   Constitutional:  Negative for chills and fever.   HENT:  Negative for congestion, rhinorrhea and sore throat.    Respiratory:  Negative for cough and shortness of breath.    Cardiovascular:  Negative for chest pain.   Gastrointestinal:         See HPI.   Genitourinary: Negative.    Skin: Negative.        VITALS:  /72   Pulse 65    4/24/2025 at 4:34 PM

## 2025-04-24 NOTE — CONSULTS
[facility] General Surgery   Harley Chatterjee MD, FACS  Arina Feliz, APRN-CNP  [address], [address]  [place] [address]  P: [phone], F: [phone]    General and Robotic Surgery  Consult Note         PATIENT NAME: Shaq Lui   :  1960   MRN: [id number]   PCP:  Daniel Alanis MD   Referring Physician: Dr. Bond  Reason for Consult: Umbilical bulge    TODAY'S DATE: 2025    HISTORY OF PRESENT ILLNESS: 65 y.o. male presents with umbilical bulge.  Abdominal pain.  Patient with known history of liver cirrhosis related to EtOH.  He was diagnosed few years ago.  Patient had ascites at that time status post drainage few years ago.  Since then patient has quit drinking.  Patient has had this umbilical bulge for a long time.  Yesterday it got very painful.  ER workup noted.  I spoke with the ER physician couple times yesterday.  He was able to reduce this incarcerated umbilical hernia although it pops right back up.  Patient states he is feeling much better today.  No significant pain.  He is passing flatus.  No BM.  No nausea vomiting.  NG tube was placed and output noted.  No fever or chills.  Voiding well.  Blood work reviewed.  CT scan noted.    PAST MEDICAL HISTORY     Past Medical History:   Diagnosis Date    Abdominal pain     Anemia     Cirrhosis (HCC)     Ear pain, bilateral     Hearing loss     History of blood transfusion     Itching        PROBLEM LIST     Patient Active Problem List   Diagnosis    Hyperbilirubinemia    Alcoholic hepatitis without ascites (HCC)    Unintentional weight loss    Hypotension due to hypovolemia    Anemia    Hyponatremia    Hypokalemia    Alcohol abuse    Severe malnutrition    S/P colonoscopic polypectomy    Dysplastic polyp of colon    Obstructed umbilical hernia       SURGICAL HISTORY       Past Surgical History:   Procedure Laterality Date    COLONOSCOPY      COLONOSCOPY N/A 2023    COLONOSCOPY POLYPECTOMY HOT SNARE WITH  clamp his NG tube and remove it in the next few hours if patient has no nausea or vomiting.  Start clears tonight if he has no nausea vomiting.  Advance diet gradually.  Recheck labs in the morning.  I will plan elective robotic laparoscopic periumbilical ventral hernia repair pending clearance.  Discussed with the patient.  He is in agreement.    Thank you for this interesting consult and for allowing us to participate in the care of this patient. If you have any questions please don't hesitate to call.    The patient, Shaq Lui is a 65 y.o. male, was seen with a total time spent of 75 minutes for the visit on this date of service by the E/M provider. The time component had both face to face and non face to face time spent in determining the total time component.  Counseling and education regarding the patient's diagnosis listed below and the patient's options regarding those diagnoses were also included in determining the time component.    Please note that portions of this note were completed with Dragon dictation, the computer voice recognition software.  Quite often unanticipated grammatical, syntax, homophones, and other interpretive errors are inadvertently transcribed by the computer software.  Please disregard these errors and any other errors that may have escaped final proofreading.     Electronically signed by Harley Chatterjee MD  on 4/23/2025 at 9:25 PM

## 2025-04-25 VITALS
SYSTOLIC BLOOD PRESSURE: 152 MMHG | DIASTOLIC BLOOD PRESSURE: 86 MMHG | OXYGEN SATURATION: 99 % | RESPIRATION RATE: 16 BRPM | WEIGHT: 238 LBS | HEIGHT: 73 IN | TEMPERATURE: 98.4 F | BODY MASS INDEX: 31.54 KG/M2 | HEART RATE: 69 BPM

## 2025-04-25 LAB
ANION GAP SERPL CALCULATED.3IONS-SCNC: 8 MMOL/L (ref 9–16)
BASOPHILS # BLD: 0 K/UL (ref 0–0.2)
BASOPHILS NFR BLD: 1 % (ref 0–2)
BUN SERPL-MCNC: 11 MG/DL (ref 8–23)
CALCIUM SERPL-MCNC: 8.1 MG/DL (ref 8.6–10.4)
CHLORIDE SERPL-SCNC: 106 MMOL/L (ref 98–107)
CO2 SERPL-SCNC: 22 MMOL/L (ref 20–31)
CREAT SERPL-MCNC: 1.1 MG/DL (ref 0.7–1.2)
EOSINOPHIL # BLD: 0.1 K/UL (ref 0–0.4)
EOSINOPHILS RELATIVE PERCENT: 2 % (ref 0–4)
ERYTHROCYTE [DISTWIDTH] IN BLOOD BY AUTOMATED COUNT: 14 % (ref 11.5–14.9)
FERRITIN SERPL-MCNC: 157 NG/ML
FOLATE SERPL-MCNC: 11.9 NG/ML (ref 4.8–24.2)
GFR, ESTIMATED: 75 ML/MIN/1.73M2
GLUCOSE SERPL-MCNC: 95 MG/DL (ref 74–99)
HCT VFR BLD AUTO: 41.2 % (ref 41–53)
HGB BLD-MCNC: 14.2 G/DL (ref 13.5–17.5)
IRON SATN MFR SERPL: 38 % (ref 20–55)
IRON SERPL-MCNC: 88 UG/DL (ref 61–157)
LYMPHOCYTES NFR BLD: 1 K/UL (ref 1–4.8)
LYMPHOCYTES RELATIVE PERCENT: 22 % (ref 24–44)
MCH RBC QN AUTO: 32.6 PG (ref 26–34)
MCHC RBC AUTO-ENTMCNC: 34.4 G/DL (ref 31–37)
MCV RBC AUTO: 94.7 FL (ref 80–100)
MONOCYTES NFR BLD: 0.3 K/UL (ref 0.1–1.3)
MONOCYTES NFR BLD: 7 % (ref 1–7)
NEUTROPHILS NFR BLD: 68 % (ref 36–66)
NEUTS SEG NFR BLD: 3.1 K/UL (ref 1.3–9.1)
PATH REV BLD -IMP: NORMAL
PLATELET # BLD AUTO: 74 K/UL (ref 150–450)
PMV BLD AUTO: 7.9 FL (ref 6–12)
POTASSIUM SERPL-SCNC: 3.7 MMOL/L (ref 3.7–5.3)
RBC # BLD AUTO: 4.35 M/UL (ref 4.5–5.9)
SODIUM SERPL-SCNC: 136 MMOL/L (ref 136–145)
SURGICAL PATHOLOGY REPORT: NORMAL
TIBC SERPL-MCNC: 229 UG/DL (ref 250–450)
UNSATURATED IRON BINDING CAPACITY: 141 UG/DL (ref 112–347)
VIT B12 SERPL-MCNC: 728 PG/ML (ref 232–1245)
WBC OTHER # BLD: 4.6 K/UL (ref 3.5–11)

## 2025-04-25 PROCEDURE — 82746 ASSAY OF FOLIC ACID SERUM: CPT

## 2025-04-25 PROCEDURE — 83550 IRON BINDING TEST: CPT

## 2025-04-25 PROCEDURE — 36415 COLL VENOUS BLD VENIPUNCTURE: CPT

## 2025-04-25 PROCEDURE — 85025 COMPLETE CBC W/AUTO DIFF WBC: CPT

## 2025-04-25 PROCEDURE — 99239 HOSP IP/OBS DSCHRG MGMT >30: CPT | Performed by: INTERNAL MEDICINE

## 2025-04-25 PROCEDURE — 99232 SBSQ HOSP IP/OBS MODERATE 35: CPT | Performed by: NURSE PRACTITIONER

## 2025-04-25 PROCEDURE — 99232 SBSQ HOSP IP/OBS MODERATE 35: CPT | Performed by: INTERNAL MEDICINE

## 2025-04-25 PROCEDURE — 82728 ASSAY OF FERRITIN: CPT

## 2025-04-25 PROCEDURE — 82607 VITAMIN B-12: CPT

## 2025-04-25 PROCEDURE — 80048 BASIC METABOLIC PNL TOTAL CA: CPT

## 2025-04-25 PROCEDURE — 6360000002 HC RX W HCPCS: Performed by: NURSE PRACTITIONER

## 2025-04-25 PROCEDURE — 83540 ASSAY OF IRON: CPT

## 2025-04-25 RX ADMIN — METRONIDAZOLE 500 MG: 500 INJECTION, SOLUTION INTRAVENOUS at 06:09

## 2025-04-25 ASSESSMENT — ENCOUNTER SYMPTOMS
SORE THROAT: 0
SHORTNESS OF BREATH: 0
COUGH: 0
RHINORRHEA: 0

## 2025-04-25 NOTE — CARE COORDINATION
ONGOING DISCHARGE PLAN:    Patient is alert and oriented x4.    Spoke with patient regarding discharge plan and patient confirms that plan is still home no needs.    Surgery Consult. Regular diet. IV Rocephin. IV Flagyl.     Will continue to follow for additional discharge needs.    If patient is discharged prior to next notation, then this note serves as note for discharge by case management.    Electronically signed by Viri Reyes RN on 4/25/2025 at 2:05 PM

## 2025-04-25 NOTE — PLAN OF CARE
Problem: Discharge Planning  Goal: Discharge to home or other facility with appropriate resources  4/25/2025 0015 by Sahq Pascual, RN  Outcome: Progressing     Problem: Pain  Goal: Verbalizes/displays adequate comfort level or baseline comfort level  4/25/2025 0015 by Shaq Pascual, RN  Outcome: Progressing     Problem: Safety - Adult  Goal: Free from fall injury  4/25/2025 0015 by Shaq Pascual, RN  Outcome: Progressing

## 2025-04-25 NOTE — TELEPHONE ENCOUNTER
Per Dr. Chatterjee patient need a Type and cross   And transfuse 1 pack platelets. From yesterdays conversation in his office.

## 2025-04-25 NOTE — PROGRESS NOTES
Today's Date: 4/25/2025  Patient Name: Shaq Lui  Date of admission: 4/22/2025  6:19 PM  Patient's age: 65 y.o., 1960  Admission Dx: Obstructed umbilical hernia [K42.0]    Reason for Consult: Thrombocytopenia  Requesting Physician: Iva Bond MD    CHIEF COMPLAINT:    Chief Complaint   Patient presents with    Abdominal Pain       History Obtained From:  patient and chart  INTERVAL HISTORY:    Patient seen and examined  Platelets around 74K  No active bleeding noted  No fever chills  Denied any nausea vomiting    HISTORY OF PRESENT ILLNESS:    Shaq Lui is a 65 y.o. male who is admitted to the hospital on 4/22/2025  for abdominal pain  Patient has a history of alcohol abuse, and history of anemia, liver cirrhosis.  Patient had a CT abdomen pelvis which showed mild obstructing small umbilical hernia.  Cirrhotic morphology noted in the liver with cholelithiasis.  Lab work showed platelet 69K therefore hematology was consulted for evaluation of thrombocytopenia.    Past Medical History:   has a past medical history of Abdominal pain, Anemia, Cirrhosis (HCC), Ear pain, bilateral, Hearing loss, History of blood transfusion, and Itching.    Past Surgical History:   has a past surgical history that includes Tonsillectomy; Colonoscopy; Endoscopy, colon, diagnostic; Colonoscopy (N/A, 2/22/2023); and Colonoscopy (N/A, 3/22/2023).     Medications:    Prior to Admission medications    Medication Sig Start Date End Date Taking? Authorizing Provider   Ferrous Sulfate (IRON PO) Take by mouth   Yes Miguel Gotti MD   Cyanocobalamin (VITAMIN B 12 PO) Take by mouth   Yes Miguel Gotti MD     Current Facility-Administered Medications   Medication Dose Route Frequency Provider Last Rate Last Admin    morphine injection 2 mg  2 mg IntraVENous Q4H PRN Berna Mac APRN - CNP   2 mg at 04/23/25 1458    Or    morphine injection 4 mg  4 mg IntraVENous Q4H PRN Berna Mac APRN - CNP   4 mg at

## 2025-04-25 NOTE — PLAN OF CARE
Problem: Discharge Planning  Goal: Discharge to home or other facility with appropriate resources  4/25/2025 1317 by Danielito Bee RN  Outcome: Adequate for Discharge  4/25/2025 0015 by Shqa Pascual RN  Outcome: Progressing     Problem: Pain  Goal: Verbalizes/displays adequate comfort level or baseline comfort level  4/25/2025 1317 by Danielito eBe RN  Outcome: Adequate for Discharge  4/25/2025 0015 by Shaq Pascual, RN  Outcome: Progressing     Problem: Safety - Adult  Goal: Free from fall injury  4/25/2025 1317 by Danielito Bee, RN  Outcome: Adequate for Discharge  4/25/2025 0015 by Shaq Pascual, RN  Outcome: Progressing

## 2025-04-25 NOTE — DISCHARGE SUMMARY
[facility] Internal Medicine    Haider Knight MD; Sean Flores MD, Iva Bond MD,Dr. TIMMY Meredith MD. ; Didier Roberto MD      [facility] Internal Medicine  IN-PATIENT SERVICE   [facility]    Discharge Summary     Patient ID: Shaq Lui  :  1960   MRN: [id number]     ACCOUNT:  [id number]   Patient's PCP: Daniel Alanis MD  Admit Date: 2025   Discharge Date: 2025     Length of Stay: 3  Code Status:  Full Code  Admitting Physician: Iva Bond MD  Discharge Physician: Didier Roberto MD     Active Discharge Diagnoses:     Hospital Problem Lists:  Principal Problem:    Obstructed umbilical hernia  Active Problems:    Alcoholic cirrhosis of liver without ascites (HCC)    Incarcerated umbilical hernia    Thrombocytopenia  Resolved Problems:    * No resolved hospital problems. *      Admission Condition:  Serious      Discharged Condition: Stable     Hospital Stay:     Hospital Course:  Shaq Lui is a 65 y.o. male who was admitted for the management of   Obstructed umbilical hernia , presented to ER with Abdominal Pain  65 yr old gentleman with underlying advanced liver cirrhosis, quit drinking few months back admitted with abdominal pain nausea vomiting and found to have small bowel obstruction, obstructive umbilical hernia, NG tube was placed, general surgery was on board, slowly got better, acute kidney injury with ATN,.  Improved with IV hydration, also had thrombocytopenia, chronic due to underlying liver disease, repeat was little better,   Advised to follow-up with hematology as outpatient,  Hernia repair as per general surgery as outpatient,  Before surgery 1 unit of platelet decided by the hematology,  At this time patient is discharged stable condition        On examination,  Alert awake oriented x3,  S1-S2 present,  CTA bilateral,  Abdomen soft nontender nondistended bowel sounds present   Extremity no edema no calf tenderness,,  Skin no

## 2025-04-25 NOTE — PROGRESS NOTES
Pt discharged at this time AVS reviewed with pt all question answered pt verbalizes understanding pt transpored home by spouse

## 2025-04-25 NOTE — PROGRESS NOTES
[facility] General Surgery   Harley Chatterjee MD, FACS  Arina Feliz, APRN-CNP  [address], [address]  [place] [address]  P: [phone], F: [phone]    General and Robotic Surgery  Progress Note             PATIENT NAME: Shaq Lui   :  1960   MRN: [id number]   PCP:  Daniel Alanis MD     TODAY'S DATE: 2025    65 y.o. male seen and examined.  Feeling much better today.  No abdominal pain.  Tolerating diet.  No nausea or vomiting.    PAST MEDICAL HISTORY     Past Medical History:   Diagnosis Date    Abdominal pain     Anemia     Cirrhosis (HCC)     Ear pain, bilateral     Hearing loss     History of blood transfusion     Itching        PROBLEM LIST     Patient Active Problem List   Diagnosis    Hyperbilirubinemia    Alcoholic hepatitis without ascites (HCC)    Unintentional weight loss    Hypotension due to hypovolemia    Anemia    Hyponatremia    Hypokalemia    Alcohol abuse    Severe malnutrition    S/P colonoscopic polypectomy    Dysplastic polyp of colon    Obstructed umbilical hernia    Alcoholic cirrhosis of liver without ascites (HCC)    Incarcerated umbilical hernia    Thrombocytopenia       SURGICAL HISTORY       Past Surgical History:   Procedure Laterality Date    COLONOSCOPY      COLONOSCOPY N/A 2023    COLONOSCOPY POLYPECTOMY HOT SNARE WITH RESOLUTION CLIPS X4 WITH EPI INJECTION performed by Cliff Zamudio MD at [facility]    COLONOSCOPY N/A 3/22/2023    COLONOSCOPY POLYPECTOMY SNARE/COLD BIOPSY performed by Cliff Zamudio MD at [facility]    ENDOSCOPY, COLON, DIAGNOSTIC      TONSILLECTOMY           Review of Systems   Constitutional:  Negative for chills and fever.   HENT:  Negative for congestion, rhinorrhea and sore throat.    Respiratory:  Negative for cough and shortness of breath.    Cardiovascular:  Negative for chest pain.   Gastrointestinal:         See HPI.   Genitourinary: Negative.    Skin: Negative.        VITALS:  BP (!) 152/86   Pulse 69

## 2025-05-01 ENCOUNTER — OFFICE VISIT (OUTPATIENT)
Age: 65
End: 2025-05-01
Payer: COMMERCIAL

## 2025-05-01 ENCOUNTER — TELEPHONE (OUTPATIENT)
Age: 65
End: 2025-05-01

## 2025-05-01 VITALS
SYSTOLIC BLOOD PRESSURE: 115 MMHG | WEIGHT: 226.4 LBS | HEART RATE: 76 BPM | OXYGEN SATURATION: 96 % | BODY MASS INDEX: 29.87 KG/M2 | TEMPERATURE: 97.2 F | DIASTOLIC BLOOD PRESSURE: 87 MMHG | RESPIRATION RATE: 18 BRPM

## 2025-05-01 DIAGNOSIS — D69.6 THROMBOCYTOPENIA: Primary | ICD-10-CM

## 2025-05-01 PROCEDURE — 99204 OFFICE O/P NEW MOD 45 MIN: CPT | Performed by: INTERNAL MEDICINE

## 2025-05-01 NOTE — TELEPHONE ENCOUNTER
Instructions   from Dr. Khris Trevizo MD    RTC 6-8 weeks w cbc      RV 6/24/25 at 9:30 and with labs to be done that day.

## 2025-05-01 NOTE — PROGRESS NOTES
Today's Date: 5/1/2025  Patient Name: Shaq Lui  Patient's age: 65 y.o., 1960    DIAGNOSIS:   Thrombocytopenia, likely secondary to liver cirrhosis  History of alcohol abuse and quit drinking 3 years ago  Patient planning umbilical hernia surgery    CHIEF COMPLAINT:    Chief Complaint   Patient presents with    Follow-up     Hospital      INTERVAL HISTORY:    Patient is returning for follow visit and to discuss lab results, imaging studies after his recent hospitalization.  He denied any abdominal pain nausea medic.  He is planning to see a general surgeon to schedule surgery for his umbilical hernia repair.    During this visit patient's allergy, social, medical, surgical history and medications were reviewed and updated.     HISTORY OF PRESENT ILLNESS:    Shaq Lui is a 65 y.o. male who is admitted to the hospital on (Not on file)  for abdominal pain  Patient has a history of alcohol abuse, and history of anemia, liver cirrhosis.  Patient had a CT abdomen pelvis which showed mild obstructing small umbilical hernia.  Cirrhotic morphology noted in the liver with cholelithiasis.  Lab work showed platelet 69K therefore hematology was consulted for evaluation of thrombocytopenia.    Past Medical History:   has a past medical history of Abdominal pain, Anemia, Cirrhosis (HCC), Ear pain, bilateral, Hearing loss, History of blood transfusion, and Itching.    Past Surgical History:   has a past surgical history that includes Tonsillectomy; Colonoscopy; Endoscopy, colon, diagnostic; Colonoscopy (N/A, 2/22/2023); and Colonoscopy (N/A, 3/22/2023).     Medications:    Current Outpatient Medications   Medication Sig Dispense Refill    Ferrous Sulfate (IRON PO) Take by mouth      Cyanocobalamin (VITAMIN B 12 PO) Take by mouth       No current facility-administered medications for this visit.       Allergies:  Pcn [penicillins]    Social History:   reports that he has never smoked. He has never been exposed to

## 2025-06-24 ENCOUNTER — HOSPITAL ENCOUNTER (OUTPATIENT)
Age: 65
Discharge: HOME | End: 2025-06-24
Payer: COMMERCIAL

## 2025-06-24 DIAGNOSIS — D69.6 THROMBOCYTOPENIA: ICD-10-CM

## 2025-06-24 LAB
BASOPHILS # BLD: 0.1 K/UL (ref 0–0.2)
BASOPHILS NFR BLD: 2 % (ref 0–2)
EOSINOPHIL # BLD: 0.2 K/UL (ref 0–0.4)
EOSINOPHILS RELATIVE PERCENT: 5 % (ref 1–4)
ERYTHROCYTE [DISTWIDTH] IN BLOOD BY AUTOMATED COUNT: 15.3 % (ref 12.5–15.4)
HCT VFR BLD AUTO: 46 % (ref 41–53)
HGB BLD-MCNC: 15.3 G/DL (ref 13.5–17.5)
LYMPHOCYTES NFR BLD: 1.1 K/UL (ref 1–4.8)
LYMPHOCYTES RELATIVE PERCENT: 25 % (ref 24–44)
MCH RBC QN AUTO: 31.7 PG (ref 26–34)
MCHC RBC AUTO-ENTMCNC: 33.3 G/DL (ref 31–37)
MCV RBC AUTO: 95.3 FL (ref 80–100)
MONOCYTES NFR BLD: 0.3 K/UL (ref 0.1–1.2)
MONOCYTES NFR BLD: 8 % (ref 2–11)
NEUTROPHILS NFR BLD: 60 % (ref 36–66)
NEUTS SEG NFR BLD: 2.6 K/UL (ref 1.8–7.7)
PLATELET # BLD AUTO: 93 K/UL (ref 140–450)
PMV BLD AUTO: 8.5 FL (ref 6–12)
RBC # BLD AUTO: 4.82 M/UL (ref 4.5–5.9)
WBC OTHER # BLD: 4.3 K/UL (ref 3.5–11)

## 2025-06-24 PROCEDURE — 36415 COLL VENOUS BLD VENIPUNCTURE: CPT

## 2025-06-24 PROCEDURE — 85025 COMPLETE CBC W/AUTO DIFF WBC: CPT

## 2025-07-24 ENCOUNTER — OFFICE VISIT (OUTPATIENT)
Dept: INTERNAL MEDICINE CLINIC | Age: 65
End: 2025-07-24
Payer: COMMERCIAL

## 2025-07-24 VITALS
OXYGEN SATURATION: 97 % | BODY MASS INDEX: 29.69 KG/M2 | HEART RATE: 70 BPM | HEIGHT: 73 IN | SYSTOLIC BLOOD PRESSURE: 102 MMHG | WEIGHT: 224 LBS | DIASTOLIC BLOOD PRESSURE: 68 MMHG

## 2025-07-24 DIAGNOSIS — Z13.220 SCREENING FOR LIPID DISORDERS: ICD-10-CM

## 2025-07-24 DIAGNOSIS — D69.6 THROMBOCYTOPENIA: ICD-10-CM

## 2025-07-24 DIAGNOSIS — E80.6 HYPERBILIRUBINEMIA: ICD-10-CM

## 2025-07-24 DIAGNOSIS — K70.30 ALCOHOLIC CIRRHOSIS OF LIVER WITHOUT ASCITES (HCC): Primary | ICD-10-CM

## 2025-07-24 DIAGNOSIS — R21 RASH OF BOTH FEET: ICD-10-CM

## 2025-07-24 DIAGNOSIS — E55.9 VITAMIN D DEFICIENCY: ICD-10-CM

## 2025-07-24 DIAGNOSIS — K42.9 UMBILICAL HERNIA WITHOUT OBSTRUCTION AND WITHOUT GANGRENE: ICD-10-CM

## 2025-07-24 DIAGNOSIS — H93.13 TINNITUS OF BOTH EARS: ICD-10-CM

## 2025-07-24 DIAGNOSIS — Z98.890 S/P COLONOSCOPIC POLYPECTOMY: ICD-10-CM

## 2025-07-24 PROBLEM — E87.6 HYPOKALEMIA: Status: RESOLVED | Noted: 2020-03-26 | Resolved: 2025-07-24

## 2025-07-24 PROBLEM — K42.0 OBSTRUCTED UMBILICAL HERNIA: Status: RESOLVED | Noted: 2025-04-22 | Resolved: 2025-07-24

## 2025-07-24 PROBLEM — E87.1 HYPONATREMIA: Status: RESOLVED | Noted: 2020-03-26 | Resolved: 2025-07-24

## 2025-07-24 PROBLEM — E86.1 HYPOTENSION DUE TO HYPOVOLEMIA: Status: RESOLVED | Noted: 2020-03-26 | Resolved: 2025-07-24

## 2025-07-24 PROBLEM — K70.10 ALCOHOLIC HEPATITIS WITHOUT ASCITES (HCC): Status: RESOLVED | Noted: 2020-03-26 | Resolved: 2025-07-24

## 2025-07-24 PROBLEM — D64.9 ANEMIA: Status: RESOLVED | Noted: 2020-03-26 | Resolved: 2025-07-24

## 2025-07-24 PROBLEM — F10.10 ALCOHOL ABUSE: Status: RESOLVED | Noted: 2020-03-26 | Resolved: 2025-07-24

## 2025-07-24 PROCEDURE — 99214 OFFICE O/P EST MOD 30 MIN: CPT

## 2025-07-24 RX ORDER — CLOTRIMAZOLE 1 %
CREAM (GRAM) TOPICAL
Qty: 28 G | Refills: 1 | Status: SHIPPED | OUTPATIENT
Start: 2025-07-24

## 2025-07-24 SDOH — HEALTH STABILITY: PHYSICAL HEALTH: ON AVERAGE, HOW MANY DAYS PER WEEK DO YOU ENGAGE IN MODERATE TO STRENUOUS EXERCISE (LIKE A BRISK WALK)?: 2 DAYS

## 2025-07-24 ASSESSMENT — ENCOUNTER SYMPTOMS
NAUSEA: 0
SHORTNESS OF BREATH: 0
WHEEZING: 0
ABDOMINAL PAIN: 0
COUGH: 0
BACK PAIN: 0
VOMITING: 0

## 2025-07-24 ASSESSMENT — PATIENT HEALTH QUESTIONNAIRE - PHQ9
SUM OF ALL RESPONSES TO PHQ QUESTIONS 1-9: 0
1. LITTLE INTEREST OR PLEASURE IN DOING THINGS: NOT AT ALL
2. FEELING DOWN, DEPRESSED OR HOPELESS: NOT AT ALL

## 2025-07-24 NOTE — PROGRESS NOTES
[facility]  [address] [address]  Dept: [phone]  Dept Fax: [phone]    Office Visit Note  Date ofpatient's visit: 7/24/2025  Patient's Name:  Shaq Lui YOB: 1960            Patient Care Team:  Charis Gauthier MD as PCP - General (Internal Medicine)  Naina Snow MD as Consulting Physician (Gastroenterology)  Cliff Zamudio MD as Consulting Physician (Gastroenterology)  ================================================================    REASON FOR VISIT/CHIEF COMPLAINT:  Establish Care    HISTORY OF PRESENTING ILLNESS:  History was obtained from: patient. Shaq Luiis a 65 y.o. is here for a new to provider visit.    Patient has been doing well.  Past medical history include alcoholic liver cirrhosis, chronic thrombocytopenia.    Alcohol liver cirrhosis: Well compensated.  Not on any medication.  No ascites.  CT showing concerns for cirrhotic morphology of the liver.  Patient otherwise well stable.  Last drink was more than 3 years ago.  Will check a CMP to evaluate the elevated bilirubin levels in the past.    Thrombocytopenia: Thought to be likely secondary to liver cirrhosis.  Follows up with hematology.  Platelet count have been stable.  Last CBC last month showed platelet count of 93.  No bleeding or any other problems noted.    Tinnitus: Patient reports that he worked in loud environment.  Also used to listen to a lot of loud music.  His hearing has been decreasing and he also has noticed a ringing/swooshing sound in the ears bilaterally (right> left).  Will send a referral to ENT for further evaluation.    Rash bilateral lower extremity: He has been having rash on his thighs as well as bilateral feet.  It has been associated with itching.  Reports that he has been using eczema cream with some relief.  The rash looks like a fungal rash.  Will start him on clotrimazole cream twice a day.    History of umbilical hernia: History

## 2025-07-24 NOTE — PROGRESS NOTES
Have you been to the ER, urgent care clinic since your last visit?  Hospitalized since your last visit?   NO    Have you seen or consulted any other health care providers outside our system since your last visit?   NO    “Have you had a colorectal cancer screening such as a colonoscopy/FIT/Cologuard?    NO    Date of last Colonoscopy: 3/22/2023  No cologuard on file  No FIT/FOBT on file   No flexible sigmoidoscopy on file

## 2025-07-28 PROBLEM — Z86.0100 HX OF COLONIC POLYPS: Status: ACTIVE | Noted: 2025-07-28

## 2025-07-28 RX ORDER — POLYETHYLENE GLYCOL 3350, SODIUM SULFATE ANHYDROUS, SODIUM BICARBONATE, SODIUM CHLORIDE, POTASSIUM CHLORIDE 236; 22.74; 6.74; 5.86; 2.97 G/4L; G/4L; G/4L; G/4L; G/4L
4 POWDER, FOR SOLUTION ORAL ONCE
Qty: 1 ML | Refills: 0 | Status: SHIPPED | OUTPATIENT
Start: 2025-07-28 | End: 2025-07-28

## 2025-07-28 RX ORDER — BISACODYL 5 MG
TABLET, DELAYED RELEASE (ENTERIC COATED) ORAL
Qty: 4 TABLET | Refills: 0 | Status: SHIPPED | OUTPATIENT
Start: 2025-07-28

## 2025-07-28 NOTE — TELEPHONE ENCOUNTER
Procedure scheduled/Dr Zamudio  Procedure: colonoscopy  Dx: hx of colon polyps  Date: 1/15/26  Time: 9:00 am procedure arrival time 7:00 am  Hospital: [facility] phone call: TBD  Bowel Prep instructions given: Golytely-dulco  In office/via phone: office  Clearance needed: none  GLP-1:  none

## 2025-07-29 NOTE — TELEPHONE ENCOUNTER
Patient called in to see if there were any sooner appointments. Writer explained they  had 07/30 spot available. Patient stated that was too soon and asked if he could be called when another spot became available because he is trying to get his hernia repaired. Writer explained that once an available spot becomes open they can try calling patient. Patient verbalized understanding.

## 2025-07-30 ENCOUNTER — HOSPITAL ENCOUNTER (OUTPATIENT)
Dept: LAB | Age: 65
Discharge: HOME | End: 2025-07-30
Payer: COMMERCIAL

## 2025-07-30 DIAGNOSIS — E80.6 HYPERBILIRUBINEMIA: ICD-10-CM

## 2025-07-30 DIAGNOSIS — Z13.220 SCREENING FOR LIPID DISORDERS: ICD-10-CM

## 2025-07-30 DIAGNOSIS — D69.6 THROMBOCYTOPENIA: ICD-10-CM

## 2025-07-30 DIAGNOSIS — K70.30 ALCOHOLIC CIRRHOSIS OF LIVER WITHOUT ASCITES (HCC): ICD-10-CM

## 2025-07-30 DIAGNOSIS — E55.9 VITAMIN D DEFICIENCY: ICD-10-CM

## 2025-07-30 LAB
25(OH)D3 SERPL-MCNC: 31.3 NG/ML (ref 30–100)
ALBUMIN SERPL-MCNC: 4.6 G/DL (ref 3.5–5.2)
ALP SERPL-CCNC: 83 U/L (ref 40–129)
ALT SERPL-CCNC: 20 U/L (ref 10–50)
ANION GAP SERPL CALCULATED.3IONS-SCNC: 14 MMOL/L (ref 9–16)
AST SERPL-CCNC: 29 U/L (ref 10–50)
BILIRUB SERPL-MCNC: 2 MG/DL (ref 0–1.2)
BUN SERPL-MCNC: 16 MG/DL (ref 8–23)
CALCIUM SERPL-MCNC: 9.3 MG/DL (ref 8.6–10.4)
CHLORIDE SERPL-SCNC: 105 MMOL/L (ref 98–107)
CHOLEST SERPL-MCNC: 108 MG/DL (ref 0–199)
CHOLESTEROL/HDL RATIO: 2.9
CO2 SERPL-SCNC: 21 MMOL/L (ref 20–31)
CREAT SERPL-MCNC: 1.5 MG/DL (ref 0.7–1.2)
FOLATE SERPL-MCNC: 12.3 NG/ML (ref 4.8–24.2)
GFR, ESTIMATED: 52 ML/MIN/1.73M2
GLUCOSE SERPL-MCNC: 88 MG/DL (ref 74–99)
HDLC SERPL-MCNC: 37 MG/DL
LDLC SERPL CALC-MCNC: 54 MG/DL (ref 0–100)
POTASSIUM SERPL-SCNC: 3.8 MMOL/L (ref 3.7–5.3)
PROT SERPL-MCNC: 7.6 G/DL (ref 6.6–8.7)
SODIUM SERPL-SCNC: 140 MMOL/L (ref 136–145)
TRIGL SERPL-MCNC: 87 MG/DL (ref 0–149)
VIT B12 SERPL-MCNC: 682 PG/ML (ref 232–1245)

## 2025-07-30 PROCEDURE — 80053 COMPREHEN METABOLIC PANEL: CPT

## 2025-07-30 PROCEDURE — 36415 COLL VENOUS BLD VENIPUNCTURE: CPT

## 2025-07-30 PROCEDURE — 82306 VITAMIN D 25 HYDROXY: CPT

## 2025-07-30 PROCEDURE — 80061 LIPID PANEL: CPT

## 2025-07-30 PROCEDURE — 82607 VITAMIN B-12: CPT

## 2025-07-30 PROCEDURE — 82746 ASSAY OF FOLIC ACID SERUM: CPT

## 2025-08-06 ENCOUNTER — TELEPHONE (OUTPATIENT)
Dept: INTERNAL MEDICINE CLINIC | Age: 65
End: 2025-08-06

## 2025-08-06 DIAGNOSIS — R21 RASH OF BOTH FEET: Primary | ICD-10-CM

## 2025-08-06 RX ORDER — TERBINAFINE HYDROCHLORIDE 250 MG/1
250 TABLET ORAL DAILY
Qty: 14 TABLET | Refills: 0 | Status: SHIPPED | OUTPATIENT
Start: 2025-08-06 | End: 2025-08-20

## 2025-08-13 DIAGNOSIS — D69.6 THROMBOCYTOPENIA: Primary | ICD-10-CM

## 2025-08-19 ENCOUNTER — OFFICE VISIT (OUTPATIENT)
Age: 65
End: 2025-08-19
Payer: COMMERCIAL

## 2025-08-19 ENCOUNTER — HOSPITAL ENCOUNTER (OUTPATIENT)
Age: 65
Discharge: HOME | End: 2025-08-19
Payer: COMMERCIAL

## 2025-08-19 VITALS
BODY MASS INDEX: 28.83 KG/M2 | TEMPERATURE: 97 F | OXYGEN SATURATION: 96 % | HEART RATE: 74 BPM | WEIGHT: 218.5 LBS | DIASTOLIC BLOOD PRESSURE: 83 MMHG | SYSTOLIC BLOOD PRESSURE: 117 MMHG

## 2025-08-19 DIAGNOSIS — D69.6 THROMBOCYTOPENIA: ICD-10-CM

## 2025-08-19 DIAGNOSIS — D69.6 THROMBOCYTOPENIA: Primary | ICD-10-CM

## 2025-08-19 LAB
BASOPHILS # BLD: 0 K/UL (ref 0–0.2)
BASOPHILS NFR BLD: 0 % (ref 0–2)
EOSINOPHIL # BLD: 0.04 K/UL (ref 0–0.4)
EOSINOPHILS RELATIVE PERCENT: 1 % (ref 1–4)
ERYTHROCYTE [DISTWIDTH] IN BLOOD BY AUTOMATED COUNT: 14.5 % (ref 12.5–15.4)
HCT VFR BLD AUTO: 45.5 % (ref 41–53)
HGB BLD-MCNC: 14.9 G/DL (ref 13.5–17.5)
LYMPHOCYTES NFR BLD: 0.81 K/UL (ref 1–4.8)
LYMPHOCYTES RELATIVE PERCENT: 23 % (ref 24–44)
MCH RBC QN AUTO: 31.7 PG (ref 26–34)
MCHC RBC AUTO-ENTMCNC: 32.8 G/DL (ref 31–37)
MCV RBC AUTO: 96.5 FL (ref 80–100)
MONOCYTES NFR BLD: 0.35 K/UL (ref 0.1–0.8)
MONOCYTES NFR BLD: 10 % (ref 1–7)
MORPHOLOGY: NORMAL
NEUTROPHILS NFR BLD: 66 % (ref 36–66)
NEUTS SEG NFR BLD: 2.3 K/UL (ref 1.8–7.7)
PLATELET # BLD AUTO: 91 K/UL (ref 140–450)
PMV BLD AUTO: 8.5 FL (ref 6–12)
RBC # BLD AUTO: 4.71 M/UL (ref 4.5–5.9)
WBC OTHER # BLD: 3.5 K/UL (ref 3.5–11)

## 2025-08-19 PROCEDURE — 85025 COMPLETE CBC W/AUTO DIFF WBC: CPT

## 2025-08-19 PROCEDURE — 99214 OFFICE O/P EST MOD 30 MIN: CPT | Performed by: INTERNAL MEDICINE

## 2025-08-19 PROCEDURE — 36415 COLL VENOUS BLD VENIPUNCTURE: CPT

## (undated) DEVICE — SNARE ENDOSCP POLYP MED STD AD 2.4X27X240 CM 2.8 MM OVL SENS

## (undated) DEVICE — FORCEPS BX L240CM WRK CHN 2.8MM STD CAP W/ NDL MIC MESH

## (undated) DEVICE — POLYP TRAP: Brand: TRAPEASE®

## (undated) DEVICE — GLOVE ORANGE PI 7 1/2   MSG9075

## (undated) DEVICE — SNARE ENDOSCP L240CM LOOP W13MM DIA2.4MM SHT THROW SM OVL

## (undated) DEVICE — CLIP LIG L235CM RESOL 360 BX/20

## (undated) DEVICE — NEEDLE SCLERO 25GA L240CM OD0.51MM ID0.24MM EXTN L4MM SHTH

## (undated) DEVICE — DEFENDO AIR WATER SUCTION AND BIOPSY VALVE KIT FOR  OLYMPUS: Brand: DEFENDO AIR/WATER/SUCTION AND BIOPSY VALVE

## (undated) DEVICE — ERBE NESSY® OMEGA PLATE USA (85+23)CM² , WITH CABLE 3 M: Brand: ERBE

## (undated) DEVICE — ENDO KIT W/SYRINGE: Brand: MEDLINE INDUSTRIES, INC.

## (undated) DEVICE — SNARE ENDOSCP L240CM W15MM SHTH DIA2.4MM CHN 2.8MM STIFF